# Patient Record
Sex: FEMALE | Race: WHITE | Employment: OTHER | URBAN - METROPOLITAN AREA
[De-identification: names, ages, dates, MRNs, and addresses within clinical notes are randomized per-mention and may not be internally consistent; named-entity substitution may affect disease eponyms.]

---

## 2017-01-31 ENCOUNTER — OFFICE VISIT (OUTPATIENT)
Dept: CARDIOLOGY | Age: 57
End: 2017-01-31

## 2017-01-31 VITALS
BODY MASS INDEX: 41.82 KG/M2 | WEIGHT: 221.5 LBS | OXYGEN SATURATION: 97 % | HEIGHT: 61 IN | TEMPERATURE: 97.9 F | SYSTOLIC BLOOD PRESSURE: 128 MMHG | RESPIRATION RATE: 18 BRPM | HEART RATE: 83 BPM | DIASTOLIC BLOOD PRESSURE: 78 MMHG

## 2017-01-31 DIAGNOSIS — R06.02 SOB (SHORTNESS OF BREATH): ICD-10-CM

## 2017-01-31 DIAGNOSIS — R53.83 FATIGUE, UNSPECIFIED TYPE: ICD-10-CM

## 2017-01-31 DIAGNOSIS — E66.01 MORBID OBESITY, UNSPECIFIED OBESITY TYPE (HCC): ICD-10-CM

## 2017-01-31 DIAGNOSIS — I44.7 LEFT BUNDLE BRANCH BLOCK: ICD-10-CM

## 2017-01-31 DIAGNOSIS — R94.31 ABNORMAL EKG: Primary | ICD-10-CM

## 2017-01-31 PROCEDURE — G8427 DOCREV CUR MEDS BY ELIG CLIN: HCPCS | Performed by: INTERNAL MEDICINE

## 2017-01-31 PROCEDURE — 3014F SCREEN MAMMO DOC REV: CPT | Performed by: INTERNAL MEDICINE

## 2017-01-31 PROCEDURE — G8419 CALC BMI OUT NRM PARAM NOF/U: HCPCS | Performed by: INTERNAL MEDICINE

## 2017-01-31 PROCEDURE — 3017F COLORECTAL CA SCREEN DOC REV: CPT | Performed by: INTERNAL MEDICINE

## 2017-01-31 PROCEDURE — G8484 FLU IMMUNIZE NO ADMIN: HCPCS | Performed by: INTERNAL MEDICINE

## 2017-01-31 PROCEDURE — 1036F TOBACCO NON-USER: CPT | Performed by: INTERNAL MEDICINE

## 2017-01-31 PROCEDURE — 99214 OFFICE O/P EST MOD 30 MIN: CPT | Performed by: INTERNAL MEDICINE

## 2017-02-07 ENCOUNTER — HOSPITAL ENCOUNTER (OUTPATIENT)
Dept: NON INVASIVE DIAGNOSTICS | Age: 57
Discharge: HOME | End: 2017-02-07
Payer: COMMERCIAL

## 2017-02-07 DIAGNOSIS — R94.31 ABNORMAL EKG: ICD-10-CM

## 2017-02-07 DIAGNOSIS — I44.7 LEFT BUNDLE BRANCH BLOCK: ICD-10-CM

## 2017-02-07 LAB
LV EF: 55 %
LVEF MODALITY: NORMAL

## 2017-02-07 PROCEDURE — 93306 TTE W/DOPPLER COMPLETE: CPT

## 2017-02-16 LAB
VITAMIN D 25-HYDROXY: 26
VITAMIN D2, 25 HYDROXY: NORMAL
VITAMIN D3,25 HYDROXY: NORMAL

## 2017-02-16 ASSESSMENT — ENCOUNTER SYMPTOMS
ALLERGIC/IMMUNOLOGIC NEGATIVE: 1
SHORTNESS OF BREATH: 1
CHEST TIGHTNESS: 0
GASTROINTESTINAL NEGATIVE: 1
EYES NEGATIVE: 1

## 2017-04-18 ENCOUNTER — HOSPITAL ENCOUNTER (OUTPATIENT)
Dept: NEUROLOGY | Age: 57
Discharge: HOME | End: 2017-04-18
Payer: COMMERCIAL

## 2017-04-18 PROCEDURE — 95886 MUSC TEST DONE W/N TEST COMP: CPT

## 2017-04-18 PROCEDURE — 95911 NRV CNDJ TEST 9-10 STUDIES: CPT

## 2017-06-01 DIAGNOSIS — F41.9 ANXIETY: ICD-10-CM

## 2017-06-01 RX ORDER — AMITRIPTYLINE HYDROCHLORIDE 10 MG/1
TABLET, FILM COATED ORAL
Qty: 90 TABLET | Refills: 3 | Status: SHIPPED | OUTPATIENT
Start: 2017-06-01 | End: 2017-12-05 | Stop reason: SDUPTHER

## 2017-12-05 DIAGNOSIS — F41.9 ANXIETY: ICD-10-CM

## 2017-12-06 RX ORDER — AMITRIPTYLINE HYDROCHLORIDE 10 MG/1
TABLET, FILM COATED ORAL
Qty: 90 TABLET | Refills: 3 | Status: SHIPPED | OUTPATIENT
Start: 2017-12-06 | End: 2018-01-03 | Stop reason: SDUPTHER

## 2018-01-03 ENCOUNTER — OFFICE VISIT (OUTPATIENT)
Dept: CARDIOLOGY | Age: 58
End: 2018-01-03

## 2018-01-03 VITALS
RESPIRATION RATE: 16 BRPM | SYSTOLIC BLOOD PRESSURE: 118 MMHG | HEIGHT: 61 IN | DIASTOLIC BLOOD PRESSURE: 72 MMHG | WEIGHT: 222 LBS | HEART RATE: 83 BPM | OXYGEN SATURATION: 97 % | TEMPERATURE: 98.1 F | BODY MASS INDEX: 41.91 KG/M2

## 2018-01-03 DIAGNOSIS — R06.02 SOB (SHORTNESS OF BREATH): ICD-10-CM

## 2018-01-03 DIAGNOSIS — I47.1 PSVT (PAROXYSMAL SUPRAVENTRICULAR TACHYCARDIA) (HCC): Primary | ICD-10-CM

## 2018-01-03 DIAGNOSIS — E78.5 HYPERLIPIDEMIA, UNSPECIFIED HYPERLIPIDEMIA TYPE: ICD-10-CM

## 2018-01-03 DIAGNOSIS — I44.7 LEFT BUNDLE BRANCH BLOCK: ICD-10-CM

## 2018-01-03 DIAGNOSIS — E66.01 MORBID OBESITY, UNSPECIFIED OBESITY TYPE (HCC): ICD-10-CM

## 2018-01-03 DIAGNOSIS — F41.9 ANXIETY: ICD-10-CM

## 2018-01-03 PROCEDURE — 93000 ELECTROCARDIOGRAM COMPLETE: CPT | Performed by: INTERNAL MEDICINE

## 2018-01-03 PROCEDURE — 3014F SCREEN MAMMO DOC REV: CPT | Performed by: INTERNAL MEDICINE

## 2018-01-03 PROCEDURE — G8427 DOCREV CUR MEDS BY ELIG CLIN: HCPCS | Performed by: INTERNAL MEDICINE

## 2018-01-03 PROCEDURE — G8417 CALC BMI ABV UP PARAM F/U: HCPCS | Performed by: INTERNAL MEDICINE

## 2018-01-03 PROCEDURE — 3017F COLORECTAL CA SCREEN DOC REV: CPT | Performed by: INTERNAL MEDICINE

## 2018-01-03 PROCEDURE — 99213 OFFICE O/P EST LOW 20 MIN: CPT | Performed by: INTERNAL MEDICINE

## 2018-01-03 PROCEDURE — G8484 FLU IMMUNIZE NO ADMIN: HCPCS | Performed by: INTERNAL MEDICINE

## 2018-01-03 PROCEDURE — 1036F TOBACCO NON-USER: CPT | Performed by: INTERNAL MEDICINE

## 2018-01-03 RX ORDER — FEXOFENADINE HYDROCHLORIDE 60 MG/1
60 TABLET, FILM COATED ORAL DAILY
COMMUNITY

## 2018-01-03 RX ORDER — LOPERAMIDE HYDROCHLORIDE 2 MG/1
2 CAPSULE ORAL 4 TIMES DAILY PRN
COMMUNITY
End: 2021-01-22

## 2018-01-03 RX ORDER — AMITRIPTYLINE HYDROCHLORIDE 10 MG/1
TABLET, FILM COATED ORAL
Qty: 90 TABLET | Refills: 1 | Status: SHIPPED | OUTPATIENT
Start: 2018-01-03 | End: 2019-03-18 | Stop reason: SDUPTHER

## 2018-01-03 RX ORDER — MECLIZINE HYDROCHLORIDE 25 MG/1
25 TABLET ORAL 2 TIMES DAILY
COMMUNITY
End: 2019-12-23 | Stop reason: SDUPTHER

## 2018-01-03 ASSESSMENT — ENCOUNTER SYMPTOMS
BLOOD IN STOOL: 0
NAUSEA: 0
SHORTNESS OF BREATH: 1
CHEST TIGHTNESS: 0
COUGH: 0
STRIDOR: 0
GASTROINTESTINAL NEGATIVE: 1
EYES NEGATIVE: 1
WHEEZING: 0

## 2018-01-03 NOTE — PROGRESS NOTES
Subsequent Progress Note  Patient: Amanda Andersen  YOB: 1960  MRN: [id number]    Chief Complaint:  Chief Complaint   Patient presents with    Cardiomyopathy     1 yr f/u, Danny Cottrell patient    Tachycardia   2/2017 echo 55%  7/2016  Deep Brain Stimulator for tremors. (device is located Left Pectoral)- followed by Jazmin Villalpando      Subjective/HPI_ No more palpiations. Has wagner no cp. Taking all meds.   No falls tremors much improved with DBS     EKG:SR 78 LBBB  Past Medical History:   Diagnosis Date    Acute serous otitis media     Asthma     Cellulitis     of left toe    Chronic depression     CTS (carpal tunnel syndrome)     Hyperlipidemia     Lumbar radiculopathy     PSVT [facility]parox[facility] tachycardia) [facility]     Seasonal allergic rhinitis     Tremor     bilateral, nonspecific        Past Surgical History:   Procedure Laterality Date    BACK SURGERY      CARDIAC CATHETERIZATION  2008    Micro vascular angina       Family History   Problem Relation Age of Onset    Miscarriages / Stillbirths Mother     Early Death Father     Heart Disease Father     High Blood Pressure Father        Social History     Social History    Marital status:      Spouse name: N/A    Number of children: N/A    Years of education: N/A     Social History Main Topics    Smoking status: Never Smoker    Smokeless tobacco: Never Used    Alcohol use Yes      Comment: very rare    Drug use: Unknown    Sexual activity: Not Asked     Other Topics Concern    None     Social History Narrative    None       Allergies   Allergen Reactions    Amoxicillin Itching     Burning and itching of the skin    Lipitor     Robaxin [Methocarbamol]        Current Outpatient Prescriptions   Medication Sig Dispense Refill    meclizine (ANTIVERT) 25 MG tablet Take 25 mg by mouth 2 times daily      fexofenadine (ALLEGRA ALLERGY) 60 MG tablet Take 60 mg by mouth daily      loperamide (IMODIUM) 2 MG capsule Take  Acute serous otitis media    Asthma    Chronic depression    Tremor    Seasonal allergic rhinitis    CTS (carpal tunnel syndrome)    Lumbar radiculopathy    Hyperlipidemia    PSVT (paroxysmal supraventricular tachycardia) (HCC)    Left bundle branch block    SOB (shortness of breath)    Morbid obesity, unspecified obesity type ([facility])       Assessment/Plan:    1. PSVT (paroxysmal supraventricular tachycardia) (HCC)  stable  - EKG 12 Lead    2. Left bundle branch block  chronic    3. SOB (shortness of breath)  [place] in part relatd to Obesity    4. Anxiety     - amitriptyline (ELAVIL) 10 MG tablet; TAKE 1 TABLET DAILY AS NEEDED  Dispense: 90 tablet; Refill: 1. Going forward she will get meds from PCP or neuro    5. Hyperlipidemia, unspecified hyperlipidemia type  statin    6. Morbid obesity, unspecified obesity type ([facility] [address]  Improve BMI       Counseling:  Heart Healthy Lifestyle, Improve BMI, Low Salt Diet, Take Precautions to Prevent Falls and Walk Daily    Return in about 6 months (around 7/3/2018).       Electronically signed by Paulita Cabot, MD on 1/3/2018 at 1:24 PM

## 2019-01-03 ENCOUNTER — OFFICE VISIT (OUTPATIENT)
Dept: CARDIOLOGY CLINIC | Age: 59
End: 2019-01-03
Payer: COMMERCIAL

## 2019-01-03 VITALS
BODY MASS INDEX: 43.05 KG/M2 | HEART RATE: 88 BPM | OXYGEN SATURATION: 94 % | SYSTOLIC BLOOD PRESSURE: 135 MMHG | HEIGHT: 61 IN | RESPIRATION RATE: 16 BRPM | DIASTOLIC BLOOD PRESSURE: 71 MMHG | WEIGHT: 228 LBS

## 2019-01-03 DIAGNOSIS — I44.7 LEFT BUNDLE BRANCH BLOCK: ICD-10-CM

## 2019-01-03 DIAGNOSIS — R06.02 SOB (SHORTNESS OF BREATH): ICD-10-CM

## 2019-01-03 DIAGNOSIS — E78.5 HYPERLIPIDEMIA, UNSPECIFIED HYPERLIPIDEMIA TYPE: ICD-10-CM

## 2019-01-03 DIAGNOSIS — I47.1 PSVT (PAROXYSMAL SUPRAVENTRICULAR TACHYCARDIA) (HCC): ICD-10-CM

## 2019-01-03 PROCEDURE — 93000 ELECTROCARDIOGRAM COMPLETE: CPT | Performed by: INTERNAL MEDICINE

## 2019-01-03 PROCEDURE — G8484 FLU IMMUNIZE NO ADMIN: HCPCS | Performed by: INTERNAL MEDICINE

## 2019-01-03 PROCEDURE — G8417 CALC BMI ABV UP PARAM F/U: HCPCS | Performed by: INTERNAL MEDICINE

## 2019-01-03 PROCEDURE — 3017F COLORECTAL CA SCREEN DOC REV: CPT | Performed by: INTERNAL MEDICINE

## 2019-01-03 PROCEDURE — 99214 OFFICE O/P EST MOD 30 MIN: CPT | Performed by: INTERNAL MEDICINE

## 2019-01-03 PROCEDURE — G8427 DOCREV CUR MEDS BY ELIG CLIN: HCPCS | Performed by: INTERNAL MEDICINE

## 2019-01-03 PROCEDURE — 1036F TOBACCO NON-USER: CPT | Performed by: INTERNAL MEDICINE

## 2019-01-03 RX ORDER — PAROXETINE 30 MG/1
TABLET, FILM COATED ORAL
COMMUNITY
Start: 2017-12-10 | End: 2021-04-02

## 2019-01-03 RX ORDER — NITROGLYCERIN 0.4 MG/1
0.4 TABLET SUBLINGUAL PRN
Qty: 25 TABLET | Refills: 5 | Status: SHIPPED | OUTPATIENT
Start: 2019-01-03 | End: 2021-01-22 | Stop reason: SDUPTHER

## 2019-01-03 ASSESSMENT — ENCOUNTER SYMPTOMS
NAUSEA: 0
GASTROINTESTINAL NEGATIVE: 1
SHORTNESS OF BREATH: 1
COUGH: 0
STRIDOR: 0
CHEST TIGHTNESS: 0
BLOOD IN STOOL: 0
EYES NEGATIVE: 1
WHEEZING: 0

## 2019-01-18 ENCOUNTER — HOSPITAL ENCOUNTER (OUTPATIENT)
Dept: NUCLEAR MEDICINE | Age: 59
Discharge: HOME | End: 2019-01-20
Payer: COMMERCIAL

## 2019-01-18 DIAGNOSIS — R06.02 SOB (SHORTNESS OF BREATH): ICD-10-CM

## 2019-01-18 PROCEDURE — 93017 CV STRESS TEST TRACING ONLY: CPT

## 2019-01-18 PROCEDURE — 3430000000 HC RX DIAGNOSTIC RADIOPHARMACEUTICAL: Performed by: INTERNAL MEDICINE

## 2019-01-18 PROCEDURE — 6360000002 HC RX W HCPCS: Performed by: INTERNAL MEDICINE

## 2019-01-18 PROCEDURE — 2580000003 HC RX 258: Performed by: INTERNAL MEDICINE

## 2019-01-18 PROCEDURE — 78452 HT MUSCLE IMAGE SPECT MULT: CPT

## 2019-01-18 PROCEDURE — A9502 TC99M TETROFOSMIN: HCPCS | Performed by: INTERNAL MEDICINE

## 2019-01-18 RX ORDER — SODIUM CHLORIDE 0.9 % (FLUSH) 0.9 %
10 SYRINGE (ML) INJECTION PRN
Status: COMPLETED | OUTPATIENT
Start: 2019-01-18 | End: 2019-01-18

## 2019-01-18 RX ADMIN — TETROFOSMIN 35.4 MILLICURIE: 0.23 INJECTION, POWDER, LYOPHILIZED, FOR SOLUTION INTRAVENOUS at 11:20

## 2019-01-18 RX ADMIN — Medication 10 ML: at 11:20

## 2019-01-18 RX ADMIN — REGADENOSON 0.4 MG: 0.08 INJECTION, SOLUTION INTRAVENOUS at 11:20

## 2019-01-18 RX ADMIN — Medication 10 ML: at 11:21

## 2019-01-18 RX ADMIN — Medication 10 ML: at 09:50

## 2019-01-18 RX ADMIN — TETROFOSMIN 10.5 MILLICURIE: 0.23 INJECTION, POWDER, LYOPHILIZED, FOR SOLUTION INTRAVENOUS at 09:50

## 2019-02-05 ENCOUNTER — TELEPHONE (OUTPATIENT)
Dept: CARDIOLOGY CLINIC | Age: 59
End: 2019-02-05

## 2019-03-18 DIAGNOSIS — F41.9 ANXIETY: ICD-10-CM

## 2019-03-18 RX ORDER — AMITRIPTYLINE HYDROCHLORIDE 10 MG/1
TABLET, FILM COATED ORAL
Qty: 90 TABLET | Refills: 1 | Status: SHIPPED | OUTPATIENT
Start: 2019-03-18

## 2019-08-14 ENCOUNTER — OFFICE VISIT (OUTPATIENT)
Dept: CARDIOLOGY CLINIC | Age: 59
End: 2019-08-14
Payer: COMMERCIAL

## 2019-08-14 VITALS
OXYGEN SATURATION: 95 % | WEIGHT: 222.6 LBS | BODY MASS INDEX: 42.03 KG/M2 | HEART RATE: 86 BPM | DIASTOLIC BLOOD PRESSURE: 68 MMHG | HEIGHT: 61 IN | SYSTOLIC BLOOD PRESSURE: 106 MMHG | RESPIRATION RATE: 16 BRPM

## 2019-08-14 DIAGNOSIS — I47.1 PSVT (PAROXYSMAL SUPRAVENTRICULAR TACHYCARDIA) (HCC): Primary | ICD-10-CM

## 2019-08-14 DIAGNOSIS — R06.02 SHORTNESS OF BREATH: ICD-10-CM

## 2019-08-14 DIAGNOSIS — I44.7 LEFT BUNDLE BRANCH BLOCK: ICD-10-CM

## 2019-08-14 DIAGNOSIS — E78.00 PURE HYPERCHOLESTEROLEMIA: ICD-10-CM

## 2019-08-14 DIAGNOSIS — I10 ESSENTIAL HYPERTENSION: ICD-10-CM

## 2019-08-14 PROCEDURE — G8427 DOCREV CUR MEDS BY ELIG CLIN: HCPCS | Performed by: INTERNAL MEDICINE

## 2019-08-14 PROCEDURE — 1036F TOBACCO NON-USER: CPT | Performed by: INTERNAL MEDICINE

## 2019-08-14 PROCEDURE — 3017F COLORECTAL CA SCREEN DOC REV: CPT | Performed by: INTERNAL MEDICINE

## 2019-08-14 PROCEDURE — 99214 OFFICE O/P EST MOD 30 MIN: CPT | Performed by: INTERNAL MEDICINE

## 2019-08-14 PROCEDURE — G8417 CALC BMI ABV UP PARAM F/U: HCPCS | Performed by: INTERNAL MEDICINE

## 2019-08-14 RX ORDER — ALBUTEROL SULFATE 1.25 MG/3ML
1 SOLUTION RESPIRATORY (INHALATION) EVERY 6 HOURS PRN
COMMUNITY

## 2019-08-14 RX ORDER — BUDESONIDE AND FORMOTEROL FUMARATE DIHYDRATE 160; 4.5 UG/1; UG/1
2 AEROSOL RESPIRATORY (INHALATION) 2 TIMES DAILY
COMMUNITY

## 2019-08-14 RX ORDER — FLUTICASONE PROPIONATE 50 MCG
1 SPRAY, SUSPENSION (ML) NASAL DAILY
COMMUNITY
End: 2021-01-22

## 2019-08-14 ASSESSMENT — ENCOUNTER SYMPTOMS
NAUSEA: 0
BLOOD IN STOOL: 0
COUGH: 0
EYES NEGATIVE: 1
WHEEZING: 0
CHEST TIGHTNESS: 0
GASTROINTESTINAL NEGATIVE: 1
SHORTNESS OF BREATH: 1
STRIDOR: 0

## 2019-10-22 ENCOUNTER — OFFICE VISIT (OUTPATIENT)
Dept: NEUROLOGY | Age: 59
End: 2019-10-22
Payer: COMMERCIAL

## 2019-10-22 VITALS — WEIGHT: 226.5 LBS | HEIGHT: 61 IN | BODY MASS INDEX: 42.76 KG/M2

## 2019-10-22 DIAGNOSIS — H81.10 BENIGN PAROXYSMAL POSITIONAL VERTIGO, UNSPECIFIED LATERALITY: ICD-10-CM

## 2019-10-22 DIAGNOSIS — R25.1 TREMORS OF NERVOUS SYSTEM: ICD-10-CM

## 2019-10-22 DIAGNOSIS — G25.0 BENIGN ESSENTIAL TREMOR: Primary | ICD-10-CM

## 2019-10-22 LAB
ALBUMIN SERPL-MCNC: 4.2 G/DL (ref 3.5–4.6)
ALP BLD-CCNC: 173 U/L (ref 40–130)
ALT SERPL-CCNC: 24 U/L (ref 0–33)
AST SERPL-CCNC: 26 U/L (ref 0–35)
BASOPHILS ABSOLUTE: 0.1 K/UL (ref 0–0.2)
BASOPHILS RELATIVE PERCENT: 0.9 %
BILIRUB SERPL-MCNC: <0.2 MG/DL (ref 0.2–0.7)
BILIRUBIN DIRECT: <0.2 MG/DL (ref 0–0.4)
BILIRUBIN, INDIRECT: ABNORMAL MG/DL (ref 0–0.6)
EOSINOPHILS ABSOLUTE: 0.1 K/UL (ref 0–0.7)
EOSINOPHILS RELATIVE PERCENT: 2.2 %
HCT VFR BLD CALC: 41.5 % (ref 37–47)
HEMOGLOBIN: 14.3 G/DL (ref 12–16)
LYMPHOCYTES ABSOLUTE: 2.3 K/UL (ref 1–4.8)
LYMPHOCYTES RELATIVE PERCENT: 34.1 %
MCH RBC QN AUTO: 34 PG (ref 27–31.3)
MCHC RBC AUTO-ENTMCNC: 34.4 % (ref 33–37)
MCV RBC AUTO: 98.9 FL (ref 82–100)
MONOCYTES ABSOLUTE: 0.7 K/UL (ref 0.2–0.8)
MONOCYTES RELATIVE PERCENT: 9.5 %
NEUTROPHILS ABSOLUTE: 3.6 K/UL (ref 1.4–6.5)
NEUTROPHILS RELATIVE PERCENT: 53.3 %
PDW BLD-RTO: 13 % (ref 11.5–14.5)
PLATELET # BLD: 273 K/UL (ref 130–400)
RBC # BLD: 4.2 M/UL (ref 4.2–5.4)
TOTAL PROTEIN: 7.8 G/DL (ref 6.3–8)
WBC # BLD: 6.8 K/UL (ref 4.8–10.8)

## 2019-10-22 PROCEDURE — 99214 OFFICE O/P EST MOD 30 MIN: CPT | Performed by: PSYCHIATRY & NEUROLOGY

## 2019-10-22 RX ORDER — PRIMIDONE 250 MG/1
250 TABLET ORAL 2 TIMES DAILY
Qty: 180 TABLET | Refills: 3 | Status: SHIPPED | OUTPATIENT
Start: 2019-10-22 | End: 2020-09-28

## 2019-10-22 ASSESSMENT — ENCOUNTER SYMPTOMS
PHOTOPHOBIA: 0
CHOKING: 0
NAUSEA: 0
BACK PAIN: 0
TROUBLE SWALLOWING: 0
VOMITING: 0
SHORTNESS OF BREATH: 0

## 2019-12-17 ENCOUNTER — APPOINTMENT (OUTPATIENT)
Dept: CT IMAGING | Age: 59
End: 2019-12-17
Payer: COMMERCIAL

## 2019-12-17 ENCOUNTER — HOSPITAL ENCOUNTER (EMERGENCY)
Age: 59
Discharge: HOME | End: 2019-12-17
Attending: STUDENT IN AN ORGANIZED HEALTH CARE EDUCATION/TRAINING PROGRAM
Payer: COMMERCIAL

## 2019-12-17 VITALS
DIASTOLIC BLOOD PRESSURE: 75 MMHG | SYSTOLIC BLOOD PRESSURE: 123 MMHG | WEIGHT: 229 LBS | HEIGHT: 61 IN | RESPIRATION RATE: 18 BRPM | OXYGEN SATURATION: 96 % | HEART RATE: 69 BPM | TEMPERATURE: 98 F | BODY MASS INDEX: 43.23 KG/M2

## 2019-12-17 DIAGNOSIS — S09.90XA CLOSED HEAD INJURY, INITIAL ENCOUNTER: ICD-10-CM
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
## 2019-12-17 DIAGNOSIS — S06.0X0A CONCUSSION WITHOUT LOSS OF CONSCIOUSNESS, INITIAL ENCOUNTER: Primary | ICD-10-CM

## 2019-12-17 LAB
ALBUMIN SERPL-MCNC: 4.2 G/DL (ref 3.5–4.6)
ALP BLD-CCNC: 175 U/L (ref 40–130)
ALT SERPL-CCNC: 29 U/L (ref 0–33)
ANION GAP SERPL CALCULATED.3IONS-SCNC: 12 MEQ/L (ref 9–15)
APTT: 28.9 SEC (ref 24.4–36.8)
AST SERPL-CCNC: 36 U/L (ref 0–35)
BASOPHILS ABSOLUTE: 0 K/UL (ref 0–0.2)
BASOPHILS RELATIVE PERCENT: 0.4 %
BILIRUB SERPL-MCNC: 0.3 MG/DL (ref 0.2–0.7)
BILIRUBIN URINE: NEGATIVE
BLOOD, URINE: NEGATIVE
BUN BLDV-MCNC: 9 MG/DL (ref 6–20)
CALCIUM SERPL-MCNC: 9.1 MG/DL (ref 8.5–9.9)
CHLORIDE BLD-SCNC: 100 MEQ/L (ref 95–107)
CLARITY: CLEAR
CO2: 25 MEQ/L (ref 20–31)
COLOR: YELLOW
CREAT SERPL-MCNC: 0.53 MG/DL (ref 0.5–0.9)
EOSINOPHILS ABSOLUTE: 0.1 K/UL (ref 0–0.7)
EOSINOPHILS RELATIVE PERCENT: 1.8 %
GFR AFRICAN AMERICAN: >60
GFR NON-AFRICAN AMERICAN: >60
GLOBULIN: 3.4 G/DL (ref 2.3–3.5)
GLUCOSE BLD-MCNC: 140 MG/DL (ref 70–99)
GLUCOSE URINE: NEGATIVE MG/DL
HCT VFR BLD CALC: 40.1 % (ref 37–47)
HEMOGLOBIN: 13.9 G/DL (ref 12–16)
INR BLD: 1
KETONES, URINE: NEGATIVE MG/DL
LEUKOCYTE ESTERASE, URINE: NEGATIVE
LYMPHOCYTES ABSOLUTE: 2.1 K/UL (ref 1–4.8)
LYMPHOCYTES RELATIVE PERCENT: 32.5 %
MCH RBC QN AUTO: 33.7 PG (ref 27–31.3)
MCHC RBC AUTO-ENTMCNC: 34.7 % (ref 33–37)
MCV RBC AUTO: 97 FL (ref 82–100)
MONOCYTES ABSOLUTE: 0.6 K/UL (ref 0.2–0.8)
MONOCYTES RELATIVE PERCENT: 10 %
NEUTROPHILS ABSOLUTE: 3.5 K/UL (ref 1.4–6.5)
NEUTROPHILS RELATIVE PERCENT: 55.3 %
NITRITE, URINE: NEGATIVE
PDW BLD-RTO: 13.1 % (ref 11.5–14.5)
PH UA: 7.5 (ref 5–9)
PLATELET # BLD: 240 K/UL (ref 130–400)
POTASSIUM SERPL-SCNC: 4.2 MEQ/L (ref 3.4–4.9)
PROTEIN UA: NEGATIVE MG/DL
PROTHROMBIN TIME: 13.2 SEC (ref 12.3–14.9)
RBC # BLD: 4.13 M/UL (ref 4.2–5.4)
SODIUM BLD-SCNC: 137 MEQ/L (ref 135–144)
SPECIFIC GRAVITY UA: 1.01 (ref 1–1.03)
TOTAL PROTEIN: 7.6 G/DL (ref 6.3–8)
URINE REFLEX TO CULTURE: NORMAL
UROBILINOGEN, URINE: 0.2 E.U./DL
WBC # BLD: 6.4 K/UL (ref 4.8–10.8)

## 2019-12-17 PROCEDURE — 85025 COMPLETE CBC W/AUTO DIFF WBC: CPT

## 2019-12-17 PROCEDURE — 6360000002 HC RX W HCPCS: Performed by: STUDENT IN AN ORGANIZED HEALTH CARE EDUCATION/TRAINING PROGRAM

## 2019-12-17 PROCEDURE — 96375 TX/PRO/DX INJ NEW DRUG ADDON: CPT

## 2019-12-17 PROCEDURE — 36415 COLL VENOUS BLD VENIPUNCTURE: CPT

## 2019-12-17 PROCEDURE — 96372 THER/PROPH/DIAG INJ SC/IM: CPT

## 2019-12-17 PROCEDURE — 80053 COMPREHEN METABOLIC PANEL: CPT

## 2019-12-17 PROCEDURE — 72125 CT NECK SPINE W/O DYE: CPT

## 2019-12-17 PROCEDURE — 96365 THER/PROPH/DIAG IV INF INIT: CPT

## 2019-12-17 PROCEDURE — 70450 CT HEAD/BRAIN W/O DYE: CPT

## 2019-12-17 PROCEDURE — 81003 URINALYSIS AUTO W/O SCOPE: CPT

## 2019-12-17 PROCEDURE — 85610 PROTHROMBIN TIME: CPT

## 2019-12-17 PROCEDURE — 85730 THROMBOPLASTIN TIME PARTIAL: CPT

## 2019-12-17 PROCEDURE — 99284 EMERGENCY DEPT VISIT MOD MDM: CPT

## 2019-12-17 RX ORDER — PROMETHAZINE HYDROCHLORIDE 25 MG/1
25 TABLET ORAL EVERY 8 HOURS PRN
Qty: 7 TABLET | Refills: 0 | Status: SHIPPED | OUTPATIENT
Start: 2019-12-17 | End: 2020-02-12

## 2019-12-17 RX ORDER — PROMETHAZINE HYDROCHLORIDE 25 MG/ML
25 INJECTION, SOLUTION INTRAMUSCULAR; INTRAVENOUS ONCE
Status: COMPLETED | OUTPATIENT
Start: 2019-12-17 | End: 2019-12-17

## 2019-12-17 RX ORDER — DEXAMETHASONE SODIUM PHOSPHATE 10 MG/ML
10 INJECTION INTRAMUSCULAR; INTRAVENOUS ONCE
Status: COMPLETED | OUTPATIENT
Start: 2019-12-17 | End: 2019-12-17

## 2019-12-17 RX ORDER — MAGNESIUM SULFATE IN WATER 40 MG/ML
2 INJECTION, SOLUTION INTRAVENOUS ONCE
Status: COMPLETED | OUTPATIENT
Start: 2019-12-17 | End: 2019-12-17

## 2019-12-17 RX ORDER — ACETAMINOPHEN 500 MG
1000 TABLET ORAL EVERY 6 HOURS PRN
Qty: 120 TABLET | Refills: 0 | Status: SHIPPED | OUTPATIENT
Start: 2019-12-17

## 2019-12-17 RX ADMIN — DEXAMETHASONE SODIUM PHOSPHATE 10 MG: 10 INJECTION INTRAMUSCULAR; INTRAVENOUS at 13:52

## 2019-12-17 RX ADMIN — PROMETHAZINE HYDROCHLORIDE 25 MG: 25 INJECTION, SOLUTION INTRAMUSCULAR; INTRAVENOUS at 13:52

## 2019-12-17 RX ADMIN — MAGNESIUM SULFATE HEPTAHYDRATE 2 G: 40 INJECTION, SOLUTION INTRAVENOUS at 13:52

## 2019-12-17 ASSESSMENT — ENCOUNTER SYMPTOMS
CHEST TIGHTNESS: 0
BACK PAIN: 0
SHORTNESS OF BREATH: 0
SINUS PRESSURE: 0
ABDOMINAL PAIN: 0
PHOTOPHOBIA: 0
COUGH: 0
TROUBLE SWALLOWING: 0
VOMITING: 0
DIARRHEA: 0

## 2019-12-17 ASSESSMENT — PAIN DESCRIPTION - FREQUENCY: FREQUENCY: CONTINUOUS

## 2019-12-17 ASSESSMENT — PAIN DESCRIPTION - DESCRIPTORS: DESCRIPTORS: ACHING;HEADACHE

## 2019-12-17 ASSESSMENT — PAIN DESCRIPTION - LOCATION: LOCATION: HEAD

## 2019-12-17 ASSESSMENT — PAIN DESCRIPTION - PAIN TYPE: TYPE: ACUTE PAIN

## 2019-12-17 ASSESSMENT — PAIN SCALES - GENERAL: PAINLEVEL_OUTOF10: 4

## 2019-12-23 RX ORDER — MECLIZINE HYDROCHLORIDE 25 MG/1
25 TABLET ORAL 2 TIMES DAILY
Qty: 60 TABLET | Refills: 2 | Status: SHIPPED | OUTPATIENT
Start: 2019-12-23 | End: 2020-04-03 | Stop reason: SDUPTHER

## 2019-12-27 ENCOUNTER — OFFICE VISIT (OUTPATIENT)
Dept: NEUROLOGY | Age: 59
End: 2019-12-27
Payer: COMMERCIAL

## 2019-12-27 VITALS — DIASTOLIC BLOOD PRESSURE: 70 MMHG | BODY MASS INDEX: 43.61 KG/M2 | WEIGHT: 230.8 LBS | SYSTOLIC BLOOD PRESSURE: 136 MMHG

## 2019-12-27 DIAGNOSIS — S06.0X0A CONCUSSION WITHOUT LOSS OF CONSCIOUSNESS, INITIAL ENCOUNTER: Primary | ICD-10-CM

## 2019-12-27 DIAGNOSIS — H81.10 BENIGN PAROXYSMAL POSITIONAL VERTIGO, UNSPECIFIED LATERALITY: ICD-10-CM

## 2019-12-27 DIAGNOSIS — M54.12 CERVICAL RADICULOPATHY: ICD-10-CM

## 2019-12-27 DIAGNOSIS — G25.0 BENIGN ESSENTIAL TREMOR: ICD-10-CM

## 2019-12-27 DIAGNOSIS — G56.01 CARPAL TUNNEL SYNDROME OF RIGHT WRIST: ICD-10-CM

## 2019-12-27 PROCEDURE — 99215 OFFICE O/P EST HI 40 MIN: CPT | Performed by: PSYCHIATRY & NEUROLOGY

## 2019-12-27 ASSESSMENT — ENCOUNTER SYMPTOMS
BACK PAIN: 0
CHOKING: 0
PHOTOPHOBIA: 0
TROUBLE SWALLOWING: 0
NAUSEA: 0
SHORTNESS OF BREATH: 0
VOMITING: 0

## 2020-01-07 ENCOUNTER — HOSPITAL ENCOUNTER (OUTPATIENT)
Dept: PHYSICAL THERAPY | Age: 60
Setting detail: THERAPIES SERIES
Discharge: HOME | End: 2020-01-07
Payer: COMMERCIAL

## 2020-01-07 PROCEDURE — 97162 PT EVAL MOD COMPLEX 30 MIN: CPT

## 2020-01-07 ASSESSMENT — PAIN SCALES - GENERAL: PAINLEVEL_OUTOF10: 4

## 2020-01-07 ASSESSMENT — PAIN DESCRIPTION - PAIN TYPE: TYPE: ACUTE PAIN

## 2020-01-07 ASSESSMENT — PAIN DESCRIPTION - ORIENTATION: ORIENTATION: RIGHT;LEFT

## 2020-01-07 ASSESSMENT — PAIN DESCRIPTION - LOCATION: LOCATION: NECK

## 2020-01-07 ASSESSMENT — PAIN DESCRIPTION - DESCRIPTORS: DESCRIPTORS: ACHING;THROBBING

## 2020-01-07 NOTE — PROGRESS NOTES
[address]  PHYSICAL THERAPY EVALUATION    Date: 2020  Patient Name: Carollee Castleman       MRN: [id number]   Account: [de-identified]   : 1960  (61 y.o.)   Gender: female   Referring Practitioner: Steve Adan MD                 Diagnosis: Cervical radiculopathy  Treatment Diagnosis: cervical pain  Additional Pertinent Hx: esstenial tremors, GERD, lumbar fusion, deep brain stimulation 2016 (pacemaker with deep brain stimulation), asthma          Past Medical History:  has a past medical history of Acute serous otitis media, Asthma, Cellulitis, Chronic depression, CTS (carpal tunnel syndrome), Essential hypertension, Hyperlipidemia, Lumbar radiculopathy, PSVT (paroxysmal supraventricular tachycardia) ([facility] [address] Seasonal allergic rhinitis, and Tremor. Past Surgical History:   has a past surgical history that includes back surgery and Cardiac catheterization (). Vital Signs  Patient Currently in Pain: Yes   Pain Screening  Patient Currently in Pain: Yes  Pain Assessment  Pain Assessment: 0-10  Pain Level: 4  Pain Type: Acute pain  Pain Location: Neck  Pain Orientation: Right;Left  Pain Descriptors: Aching; Throbbing                Lives With: Spouse  Type of Home: House  Home Layout: Two level  Home Access: Stairs to enter with rails  Entrance Stairs - Number of Steps: 4  ADL Assistance: Independent  Ambulation Assistance: Independent  Transfer Assistance: Independent  Occupation: Retired  Additional Comments: hx of one fall in December        Subjective:  Subjective: Patient reports sudden fall on 12/10/19 without cause. Reports going to ER 7 days later. Reports CT and x-ray was negative for fracture and referred to neurologist.  Reports cervical pain since fall. Increased pain with sitting. Decreased pain with laying down and heat. Denies any numbness and tingling in UEs.       Comments: RTD in 3 months    Objective:   Sensation  Overall Sensation Status: [facility] AROM  Modalities:  Modalities  Moist heat: PRN*  E-stim (parameters): contraindicated secondary to pacemaker for deep brain stimulation  Manual:  Manual therapy  PROM: cervical AROM*  Manual traction: gentle cervical*  Soft Tissue Mobalization: STM subocciptials, cervical paraspinals, upper trap, SCM*  Other: cupping to upper trap and periscapular musculature*  *Indicates exercise,modality, or manual techniques to be initiated when appropriate  Assessment: Body structures, Functions, Activity limitations: Decreased ROM, Decreased strength, Decreased endurance, Increased pain, Decreased posture  Assessment: Patient reports fall on 12/10/19 causing neck pain without relief. Upon PT evaluation, patient demonstrates limited cervical AROM with impaired UE strength. Further skilled PT recommended to decrease pain and improved functional tolerance for overall quality of life. Prognosis: Good  Discharge Recommendations: Continue to assess pending progress        Decision Making: Medium Complexity  History: deep brain stimulator with pacemaker, GERD, cardiac stents  Exam: decreased UE strength, increased cervical pain, decreased posture, decreased neck flexor endurance  Clinical Presentation: evolving        Plan  Frequency/Duration:  Plan  Times per week: 2  Plan weeks: 6  Current Treatment Recommendations: Strengthening, ROM, Endurance Training, Manual Therapy - Soft Tissue Mobilization, Manual Therapy - Joint Manipulation, Neuromuscular Re-education, Home Exercise Program, Safety Education & Training, Patient/Caregiver Education & Training, Modalities         Patient Education  New Education Provided: PT Education: Goals;PT Role;Plan of Care;Home Exercise Program    POST-PAIN     Pain Rating (0-10 pain scale):   4/10  Location and pain description same as pre-treatment unless indicated.    Action: [] NA  [] Call Physician  [x] Perform HEP  [x] Meds as prescribed    Evaluation and patient rights have been reviewed and

## 2020-01-07 NOTE — PLAN OF CARE
[address]     Ph: [phone]  Fax: [phone]    [] Certification  [] Recertification [x]  Plan of Care  [] Progress Note [] Discharge      To:  Odessa Wilcox MD      From:  Serena Berg, LUCHO  Patient: Gómez Blount     : 1960  Diagnosis: Cervical radiculopathy     Date: 2020  Treatment Diagnosis: cervical pain       Progress Report Period from:  2020  to 2020    Total # of Visits to Date: 1   No Show: 0    Canceled Appointment: 0     OBJECTIVE:   Short Term Goals - Time Frame for Short term goals: 4 weeks    Goals Current/Discharge status  Met   Short term goal 1: Patient will report </= 1/10 pain in cervical region with sitting position. Patient reports 4/10 pain in cervical region. [] yes  [x] no   Short term goal 2: Patient will be independent with HEP. Patient will be independent with HEP. [] yes  [x] no     Long Term Goals - Time Frame for Long term goals : 6 weeks  Goals Current/ Discharge status Met   Long term goal 1: Patient will increase cervical AROM >/= 5-10 degrees for functional tolerance. AROM LUE (degrees)  LUE General AROM: shoulder WFLs  Spine  Cervical: flexion 42 deg, ext 15 deg with increased pain, bilateral SB 25 deg with increase in pain, right rotation 41 deg, left rotation 21 deg  AROM RUE (degrees)  RUE General AROM: shoulder WFLs   [] yes  [x] no   Long term goal 2: Patient will increase strength in bilateral UEs >/= 4+/5 for improved lifting tolerance.        Strength RUE  Strength RUE: Exception  R Shoulder Flexion: 4/5  R Shoulder Extension: 4+/5  R Shoulder ABduction: 4/5  R Shoulder Internal Rotation: 4/5  R Shoulder External Rotation: 4-/5  R Elbow Flexion: 4/5  R Elbow Extension: 4-/5  R Wrist Flexion: 4-/5  R Wrist Extension: 4/5  Strength LUE  Strength LUE: Exception  L Shoulder Flexion: 4/5  L Shoulder Extension: 4+/5  L Shoulder ABduction:

## 2020-01-09 ENCOUNTER — HOSPITAL ENCOUNTER (OUTPATIENT)
Dept: PHYSICAL THERAPY | Age: 60
Setting detail: THERAPIES SERIES
Discharge: HOME | End: 2020-01-09
Payer: COMMERCIAL

## 2020-01-09 PROCEDURE — 97140 MANUAL THERAPY 1/> REGIONS: CPT

## 2020-01-09 PROCEDURE — 97110 THERAPEUTIC EXERCISES: CPT

## 2020-01-09 ASSESSMENT — PAIN SCALES - GENERAL: PAINLEVEL_OUTOF10: 0

## 2020-01-14 ENCOUNTER — HOSPITAL ENCOUNTER (OUTPATIENT)
Dept: PHYSICAL THERAPY | Age: 60
Setting detail: THERAPIES SERIES
Discharge: HOME | End: 2020-01-14
Payer: COMMERCIAL

## 2020-01-14 PROCEDURE — 97140 MANUAL THERAPY 1/> REGIONS: CPT

## 2020-01-14 PROCEDURE — 97110 THERAPEUTIC EXERCISES: CPT

## 2020-01-14 ASSESSMENT — PAIN SCALES - GENERAL: PAINLEVEL_OUTOF10: 3

## 2020-01-14 ASSESSMENT — PAIN DESCRIPTION - LOCATION: LOCATION: NECK

## 2020-01-14 ASSESSMENT — PAIN DESCRIPTION - ORIENTATION: ORIENTATION: LOWER

## 2020-01-14 ASSESSMENT — PAIN DESCRIPTION - DESCRIPTORS: DESCRIPTORS: ACHING;DULL

## 2020-01-14 NOTE — PROGRESS NOTES
Decreased endurance, Increased pain, Decreased posture  Assessment: Added UT str and LS str to current exercises with cues to keep within comfortable ROM. Modified chin tucks to supine to improve technique. Continued with cupping for MFD with good tolerance. Treatment Diagnosis: cervical pain  Prognosis: Good     Goals:  Short term goals  Time Frame for Short term goals: 4 weeks  Short term goal 1: Patient will report </= 1/10 pain in cervical region with sitting position. Short term goal 2: Patient will be independent with HEP. Long term goals  Time Frame for Long term goals : 6 weeks  Long term goal 1: Patient will increase cervical AROM >/= 5-10 degrees for functional tolerance. Long term goal 2: Patient will increase strength in bilateral UEs >/= 4+/5 for improved lifting tolerance. Long term goal 3: Patient will demonstrate improved upright posture without verbal cues for improved sitting tolerance. Long term goal 4: NDI </= 14/50 to demonstrate improved functional tolerance. Progress toward goals: Progressing towards all    POST-PAIN       Pain Rating (0-10 pain scale):   3/10   Location and pain description same as pre-treatment unless indicated. Action: [] NA   [x] Perform HEP  [] Meds as prescribed  [] Modalities as prescribed   [] Call Physician     Frequency/Duration:  Plan  Times per week: 2  Plan weeks: 6  Current Treatment Recommendations: Strengthening, ROM, Endurance Training, Manual Therapy - Soft Tissue Mobilization, Manual Therapy - Joint Manipulation, Neuromuscular Re-education, Home Exercise Program, Safety Education & Training, Patient/Caregiver Education & Training, Modalities     Pt to continue current HEP. See objective section for any therapeutic exercise changes, additions or modifications this date.          PT Individual Minutes  Time In: 9744  Time Out: 8785  Minutes: 56  Timed Code Treatment Minutes: 46 Minutes  Procedure Minutes: MH x10 min     Timed Activity Minutes Units

## 2020-01-16 ENCOUNTER — HOSPITAL ENCOUNTER (OUTPATIENT)
Dept: PHYSICAL THERAPY | Age: 60
Setting detail: THERAPIES SERIES
Discharge: HOME | End: 2020-01-16
Payer: COMMERCIAL

## 2020-01-16 PROCEDURE — 97140 MANUAL THERAPY 1/> REGIONS: CPT

## 2020-01-16 PROCEDURE — 97110 THERAPEUTIC EXERCISES: CPT

## 2020-01-16 ASSESSMENT — PAIN DESCRIPTION - LOCATION: LOCATION: NECK

## 2020-01-16 ASSESSMENT — PAIN DESCRIPTION - ORIENTATION: ORIENTATION: LOWER

## 2020-01-16 ASSESSMENT — PAIN SCALES - GENERAL: PAINLEVEL_OUTOF10: 3

## 2020-01-16 ASSESSMENT — PAIN DESCRIPTION - PAIN TYPE: TYPE: ACUTE PAIN

## 2020-01-16 ASSESSMENT — PAIN DESCRIPTION - DESCRIPTORS: DESCRIPTORS: SORE

## 2020-01-16 NOTE — PROGRESS NOTES
*Indicates exercise, modality, or manual techniques to be initiated when appropriate    Assessment: Body structures, Functions, Activity limitations: Decreased ROM, Decreased strength, Decreased endurance, Increased pain, Decreased posture  Assessment: Patient demonstrates increase in cervical ROM this date and reports relief of pain since coming to PT. Continued to work on decreasing upper trap and cervical tightness with manual including cupping on upper trap. Continue per POC. Treatment Diagnosis: cervical pain          Goals:  Short term goals  Time Frame for Short term goals: 4 weeks  Short term goal 1: Patient will report </= 1/10 pain in cervical region with sitting position. Short term goal 2: Patient will be independent with HEP. Long term goals  Time Frame for Long term goals : 6 weeks  Long term goal 1: Patient will increase cervical AROM >/= 5-10 degrees for functional tolerance. Long term goal 2: Patient will increase strength in bilateral UEs >/= 4+/5 for improved lifting tolerance. Long term goal 3: Patient will demonstrate improved upright posture without verbal cues for improved sitting tolerance. Long term goal 4: NDI </= 14/50 to demonstrate improved functional tolerance. Progress toward goals: ROM    POST-PAIN       Pain Rating (0-10 pain scale):   3/10   Location and pain description same as pre-treatment unless indicated. Action: [] NA   [x] Perform HEP  [x] Meds as prescribed  [x] Modalities as prescribed   [] Call Physician     Frequency/Duration:  Plan  Times per week: 2  Plan weeks: 6  Current Treatment Recommendations: Strengthening, ROM, Endurance Training, Manual Therapy - Soft Tissue Mobilization, Manual Therapy - Joint Manipulation, Neuromuscular Re-education, Home Exercise Program, Safety Education & Training, Patient/Caregiver Education & Training, Modalities     Pt to continue current HEP.   See objective section for any therapeutic exercise changes, additions or modifications this date.          PT Individual Minutes  Time In: 0584  Time Out: 1213  Minutes: 54  Timed Code Treatment Minutes: 44 Minutes  Procedure Minutes:10 minutes HP   Timed Activity Minutes Units   Ther Ex  29  2   Manual   15  1       Signature:  Electronically signed by Sage Roldan PT on 1/16/20 at 12:53 PM

## 2020-01-21 ENCOUNTER — HOSPITAL ENCOUNTER (OUTPATIENT)
Dept: PHYSICAL THERAPY | Age: 60
Setting detail: THERAPIES SERIES
Discharge: HOME | End: 2020-01-21
Payer: COMMERCIAL

## 2020-01-21 NOTE — PROGRESS NOTES
[address]       Physical Therapy  Cancellation/No-show Note  Patient Name:  Gómez Blount  :  1960   Date:  2020  Referring Practitioner: Odessa Wilcox MD  Diagnosis: Cervical radiculopathy    Visit Information:  PT Visit Information  Onset Date: 12/10/19  PT Insurance Information: Medical [place]  Total # of Visits Approved: (visits are based off medical necessity; $40 copay)  Total # of Visits to Date: 4  No Show: 0  Canceled Appointment: 1  Progress Note Counter:  Called to cx, sick    For today's appointment patient:  [x]  Cancelled  []  Rescheduled appointment  []  No-show   []  Called pt to remind of next appointment     Reason given by patient:  [x]  Patient ill  []  Conflicting appointment  []  No transportation    []  Conflict with work  []  No reason given  []  Other:       Comments:       Signature: Electronically signed by Rakel Conde PTA on 20 at 10:20 AM

## 2020-01-23 ENCOUNTER — HOSPITAL ENCOUNTER (OUTPATIENT)
Dept: PHYSICAL THERAPY | Age: 60
Setting detail: THERAPIES SERIES
Discharge: HOME | End: 2020-01-23
Payer: COMMERCIAL

## 2020-01-23 PROCEDURE — 97110 THERAPEUTIC EXERCISES: CPT

## 2020-01-23 PROCEDURE — 97140 MANUAL THERAPY 1/> REGIONS: CPT

## 2020-01-23 ASSESSMENT — PAIN DESCRIPTION - ORIENTATION: ORIENTATION: LOWER

## 2020-01-23 ASSESSMENT — PAIN DESCRIPTION - LOCATION: LOCATION: NECK

## 2020-01-23 ASSESSMENT — PAIN SCALES - GENERAL: PAINLEVEL_OUTOF10: 2

## 2020-01-23 ASSESSMENT — PAIN DESCRIPTION - DESCRIPTORS: DESCRIPTORS: ACHING

## 2020-01-23 NOTE — PROGRESS NOTES
[address] [address]  Outpatient Physical Therapy    Treatment Note        Date: 2020  Patient: Edgardo Dee  : 1960  ACCT #: [de-identified]  Referring Practitioner: Cliff Schaefer MD  Diagnosis: Cervical radiculopathy    Visit Information:  PT Visit Information  Onset Date: 12/10/19  PT Insurance Information: Medical [place]  Total # of Visits Approved: (visits are based off medical necessity; $40 copay)  Total # of Visits to Date: 5  No Show: 0  Canceled Appointment: 1  Progress Note Counter:      Subjective: Pt reports feels therapy has been helping thus far. Reports compliance with HEP. Comments: RTD in 3 months  HEP Compliance:  [x] Good [] Fair [] Poor [] Reports not doing due to:    Vital Signs  Patient Currently in Pain: Yes   Pain Screening  Patient Currently in Pain: Yes  Pain Assessment  Pain Assessment: 0-10  Pain Level: 2  Pain Location: Neck  Pain Orientation: Lower  Pain Descriptors: Aching    OBJECTIVE:   Exercises  Exercise 2: UE ergometer L1.5 forward x 2.5 minutes, retro x 2.5 minutes  Exercise 3: upper trap stretch 30 sec x 3 b/l without overpressure  Exercise 4: levator scap stretch 30 sec x 3 b/l without overpressure  Exercise 5: Posterior shoulder rolls x 15, scapular retraction 5 sec x 15  Exercise 6: supine chin tucks 5 sec x 15 with towel roll  Exercise 7: YTB rows/lats x 10  Exercise 8: Supine YTB shoulder IR/ER chest pulls x10 ea  Exercise 20: HEP: rows/lats     Manual:   Manual therapy  Manual traction: gentle cervical  Soft Tissue Mobalization: STM subocciptials, cervical paraspinals, upper trap   Other: total manual 10 minutes    Modalities:  Modalities  Moist heat: HP to cervical x 10 minutes to decreased tightness and pain     *Indicates exercise, modality, or manual techniques to be initiated when appropriate    Assessment:    Body structures, Functions, Activity limitations: Decreased ROM, Decreased strength, Decreased endurance, Increased pain, Decreased posture  Assessment: Added supine chest pulls with good tolerance. Continues to demonstrate mild guarding and increased cervical mm tneion with manual, though with relief with gentle cervical traction. Treatment Diagnosis: cervical pain  Prognosis: Good     Goals:  Short term goals  Time Frame for Short term goals: 4 weeks  Short term goal 1: Patient will report </= 1/10 pain in cervical region with sitting position. Short term goal 2: Patient will be independent with HEP. Long term goals  Time Frame for Long term goals : 6 weeks  Long term goal 1: Patient will increase cervical AROM >/= 5-10 degrees for functional tolerance. Long term goal 2: Patient will increase strength in bilateral UEs >/= 4+/5 for improved lifting tolerance. Long term goal 3: Patient will demonstrate improved upright posture without verbal cues for improved sitting tolerance. Long term goal 4: NDI </= 14/50 to demonstrate improved functional tolerance. Progress toward goals: Progressing towards all    POST-PAIN       Pain Rating (0-10 pain scale):   1/10   Location and pain description same as pre-treatment unless indicated. Action: [] NA   [x] Perform HEP  [] Meds as prescribed  [] Modalities as prescribed   [] Call Physician     Frequency/Duration:  Plan  Times per week: 2  Plan weeks: 6  Current Treatment Recommendations: Strengthening, ROM, Endurance Training, Manual Therapy - Soft Tissue Mobilization, Manual Therapy - Joint Manipulation, Neuromuscular Re-education, Home Exercise Program, Safety Education & Training, Patient/Caregiver Education & Training, Modalities     Pt to continue current HEP. See objective section for any therapeutic exercise changes, additions or modifications this date.           PT Individual Minutes  Time In: 9617  Time Out: 1303  Minutes: 50  Timed Code Treatment Minutes: 40 Minutes  Procedure Minutes: MH x10 min     Timed Activity Minutes Units   Ther Ex 30 2   Manual  10 1       Signature: Electronically signed by Homer Glover PTA on 1/23/20 at 11:33 AM

## 2020-01-27 ENCOUNTER — HOSPITAL ENCOUNTER (OUTPATIENT)
Dept: PHYSICAL THERAPY | Age: 60
Setting detail: THERAPIES SERIES
Discharge: HOME | End: 2020-01-27
Payer: COMMERCIAL

## 2020-01-27 PROCEDURE — 97110 THERAPEUTIC EXERCISES: CPT

## 2020-01-27 PROCEDURE — 97140 MANUAL THERAPY 1/> REGIONS: CPT

## 2020-01-27 ASSESSMENT — PAIN DESCRIPTION - ORIENTATION: ORIENTATION: LEFT

## 2020-01-27 ASSESSMENT — PAIN SCALES - GENERAL: PAINLEVEL_OUTOF10: 5

## 2020-01-27 ASSESSMENT — PAIN DESCRIPTION - DESCRIPTORS: DESCRIPTORS: SORE

## 2020-01-27 ASSESSMENT — PAIN DESCRIPTION - LOCATION: LOCATION: NECK;ARM

## 2020-01-27 ASSESSMENT — PAIN DESCRIPTION - PAIN TYPE: TYPE: ACUTE PAIN

## 2020-01-27 NOTE — PROGRESS NOTES
[address] [address]  Outpatient Physical Therapy    Treatment Note        Date: 2020  Patient: Radha Chaudhry  : 1960  ACCT #: [de-identified]  Referring Practitioner: Parris Montanez MD  Diagnosis: Cervical radiculopathy    Visit Information:  PT Visit Information  Onset Date: 12/10/19  PT Insurance Information: Medical [place]  Total # of Visits Approved: (visits are based off medical necessity; $40 copay)  Total # of Visits to Date: 6  No Show: 0  Canceled Appointment: 1  Progress Note Counter:     Subjective: Pt reports soreness in cervical/Lt UE over the weekend. Reports cupping was not performed last visit and could be why she was sore hours after session. Min discomfort with lats/row HEP at home.    Comments: RTD in 3 months  HEP Compliance:  [x] Good [] Fair [] Poor [] Reports not doing due to:    Vital Signs  Patient Currently in Pain: Yes   Pain Screening  Patient Currently in Pain: Yes  Pain Assessment  Pain Level: 5  Pain Type: Acute pain  Pain Location: Neck;Arm  Pain Orientation: Left  Pain Descriptors: Sore    OBJECTIVE:   Exercises  Exercise 2: UE ergometer L1.8 forward x 2.5 minutes, retro x 2.5 minutes  Exercise 3: upper trap stretch 30 sec x 2b/l without overpressure (reviewed for HEP)  Exercise 4: levator scap stretch 30 sec x 2 b/l without overpressure(reviewed for HEP)  Exercise 6: supine chin tucks 5 sec x 15 with towel roll- VCs to improve technique  Exercise 8: Supine YTB shoulder IR/ER chest pulls x10 ea  Exercise 9: Corner str 3/30sec to improve posture            Manual:   Manual therapy  Manual traction: gentle cervical  Soft Tissue Mobalization: STM subocciptials, cervical paraspinals, upper trap   Other: Cupping to UE and thoracic paraspinals: static holds and gliding to improve muscle tightness and circulation to decrease pain: 15min total    Modalities:  Modalities  Moist heat: HP to cervical x 10 minutes to decreased tightness and pain  E-stim (parameters): contraindicated secondary to pacemaker for deep brain stimulation     *Indicates exercise, modality, or manual techniques to be initiated when appropriate    Assessment: Body structures, Functions, Activity limitations: Decreased ROM, Decreased strength, Decreased endurance, Increased pain, Decreased posture  Assessment: Initiated corner stretch to improve flexibility and improve overall posture. Continued cupping to improve muscle tightness and decrease pain. Pt requires VCs to improve technique with chin tucks, demo'ing good carry over with instruction. Discussed hand placement with lats/row exercise on Tband to decrease tension and improve technique. Treatment Diagnosis: cervical pain  Prognosis: Good       Goals:  Short term goals  Time Frame for Short term goals: 4 weeks  Short term goal 1: Patient will report </= 1/10 pain in cervical region with sitting position. Short term goal 2: Patient will be independent with HEP. Long term goals  Time Frame for Long term goals : 6 weeks  Long term goal 1: Patient will increase cervical AROM >/= 5-10 degrees for functional tolerance. Long term goal 2: Patient will increase strength in bilateral UEs >/= 4+/5 for improved lifting tolerance. Long term goal 3: Patient will demonstrate improved upright posture without verbal cues for improved sitting tolerance. Long term goal 4: NDI </= 14/50 to demonstrate improved functional tolerance. Progress toward goals: Strength, ROM    POST-PAIN       Pain Rating (0-10 pain scale):   0/10   Location and pain description same as pre-treatment unless indicated.    Action: [x] NA   [] Perform HEP  [] Meds as prescribed  [] Modalities as prescribed   [] Call Physician     Frequency/Duration:  Plan  Times per week: 2  Plan weeks: 6  Current Treatment Recommendations: Strengthening, ROM, Endurance Training, Manual Therapy - Soft Tissue Mobilization, Manual Therapy - Joint Manipulation, Neuromuscular Re-education, Home Exercise Program, Safety Education & Training, Patient/Caregiver Education & Training, Modalities     Pt to continue current HEP. See objective section for any therapeutic exercise changes, additions or modifications this date.          PT Individual Minutes  Time In: 1120  Time Out: 1210  Minutes: 50  Timed Code Treatment Minutes: 40 Minutes  Procedure Minutes:10- HP     Timed Activity Minutes Units   Ther Ex 25 2   Manual  15 1       Signature:  Electronically signed by Ly Orosco PTA on 1/27/20 at 11:27 AM

## 2020-01-30 ENCOUNTER — HOSPITAL ENCOUNTER (OUTPATIENT)
Dept: PHYSICAL THERAPY | Age: 60
Setting detail: THERAPIES SERIES
Discharge: HOME | End: 2020-01-30
Payer: COMMERCIAL

## 2020-01-30 NOTE — PROGRESS NOTES
[address]       Physical Therapy  Cancellation/No-show Note  Patient Name:  Darling Munguia  :  1960   Date:  2020  Referring Practitioner: Nazario Roth MD  Diagnosis: Cervical radiculopathy    Visit Information:  PT Visit Information  Onset Date: 12/10/19  PT Insurance Information: Medical [place]  Total # of Visits Approved: (visits are based off medical necessity; $40 copay)  Total # of Visits to Date: 6  No Show: 0  Canceled Appointment: 2  Progress Note Counter:  (cx 20)    For today's appointment patient:  [x]  Cancelled  []  Rescheduled appointment  []  No-show   []  Called pt to remind of next appointment     Reason given by patient:  [x]  Patient ill  []  Conflicting appointment  []  No transportation    []  Conflict with work  []  No reason given  []  Other:       Comments:       Signature: Electronically signed by Antoinette Jaramillo PT on 20 at 10:41 AM

## 2020-02-04 ENCOUNTER — HOSPITAL ENCOUNTER (OUTPATIENT)
Dept: PHYSICAL THERAPY | Age: 60
Setting detail: THERAPIES SERIES
Discharge: HOME | End: 2020-02-04
Payer: COMMERCIAL

## 2020-02-04 PROCEDURE — 97140 MANUAL THERAPY 1/> REGIONS: CPT

## 2020-02-04 PROCEDURE — 97110 THERAPEUTIC EXERCISES: CPT

## 2020-02-04 NOTE — PROGRESS NOTES
[address] [address]  Outpatient Physical Therapy    Treatment Note        Date: 2020  Patient: Ellis Armstrong  : 1960  ACCT #: [de-identified]  Referring Practitioner: Tamir Rai MD  Diagnosis: Cervical radiculopathy    Visit Information:  PT Visit Information  Onset Date: 12/10/19  PT Insurance Information: Medical [place]  Total # of Visits Approved: (visits are based off medical necessity; $40 copay)  Total # of Visits to Date: 7  No Show: 0  Canceled Appointment: 2  Progress Note Counter:     Subjective: Pt reports nio pain, had some stiffness over the weekend. Comments: RTD in 3 months  HEP Compliance:  [x] Good [] Fair [] Poor [] Reports not doing due to:    Vital Signs  Patient Currently in Pain: Denies   Pain Screening  Patient Currently in Pain: Denies    OBJECTIVE:   Exercises  Exercise 2: UE ergometer L2.0 forward x 2.5 minutes, retro x 2.5 minutes  Exercise 5: Posterior shoulder rolls x 20, scapular retraction 5 sec x 20  Exercise 6: supine chin tucks 5 sec x 15 with towel roll-   Exercise 7: YTB rows/lats x 15  Exercise 8: Supine YTB shoulder IR/ER chest pulls x15 ea  Exercise 9: Corner str 3/30sec to improve posture  Exercise 10: B ER YTB x 10          Strength: [x] NT  [] MMT completed:     ROM: [x] NT  [] ROM measurements:         Manual:   Manual therapy  Manual traction: gentle cervical  Soft Tissue Mobalization: STM subocciptials, cervical paraspinals, upper trap   Other: Cupping to UE and thoracic paraspinals: static holds and gliding to improve muscle tightness and circulation to decrease pain: 16min total    Modalities:  Modalities  Moist heat: HP to cervical x 10 minutes to decreased tightness and pain  E-stim (parameters): contraindicated secondary to pacemaker for deep brain stimulation     *Indicates exercise, modality, or manual techniques to be initiated when appropriate    Assessment:         Body structures, Functions, Activity limitations: Decreased ROM, Decreased strength, Decreased endurance, Increased pain, Decreased posture  Assessment: Initiated Adelfo. ER with band and progressed current exercises. demonstrates good understanding of exercises program. Mild tighness Adelfo cervical musculature decreased with manual techniques. Good tolerance to treatment. Treatment Diagnosis: cervical pain  Prognosis: Good       Goals:  Short term goals  Time Frame for Short term goals: 4 weeks  Short term goal 1: Patient will report </= 1/10 pain in cervical region with sitting position. Short term goal 2: Patient will be independent with HEP. Long term goals  Time Frame for Long term goals : 6 weeks  Long term goal 1: Patient will increase cervical AROM >/= 5-10 degrees for functional tolerance. Long term goal 2: Patient will increase strength in bilateral UEs >/= 4+/5 for improved lifting tolerance. Long term goal 3: Patient will demonstrate improved upright posture without verbal cues for improved sitting tolerance. Long term goal 4: NDI </= 14/50 to demonstrate improved functional tolerance. Progress toward goals: on going. POST-PAIN       Pain Rating (0-10 pain scale):  0 /10   Location and pain description same as pre-treatment unless indicated. Action: [] NA   [x] Perform HEP  [] Meds as prescribed  [] Modalities as prescribed   [] Call Physician     Frequency/Duration:  Plan  Times per week: 2  Plan weeks: 6  Current Treatment Recommendations: Strengthening, ROM, Endurance Training, Manual Therapy - Soft Tissue Mobilization, Manual Therapy - Joint Manipulation, Neuromuscular Re-education, Home Exercise Program, Safety Education & Training, Patient/Caregiver Education & Training, Modalities     Pt to continue current HEP. See objective section for any therapeutic exercise changes, additions or modifications this date.          PT Individual Minutes  Time In: 1120  Time Out: 0163  Minutes: 51  Timed Code Treatment Minutes: 40 Minutes  Procedure Minutes: HP 10     Timed Activity Minutes Units   Ther Ex 24 2   Manual  16 1       Signature:  Electronically signed by Karen Crane PTA on 2/4/20 at 12:11 PM

## 2020-02-06 ENCOUNTER — HOSPITAL ENCOUNTER (OUTPATIENT)
Dept: PHYSICAL THERAPY | Age: 60
Setting detail: THERAPIES SERIES
Discharge: HOME | End: 2020-02-06
Payer: COMMERCIAL

## 2020-02-06 NOTE — PROGRESS NOTES
[address]       Physical Therapy  Cancellation/No-show Note  Patient Name:  Steve Suarez  :  1960   Date:  2020  Referring Practitioner: Ed Calle MD  Diagnosis: Cervical radiculopathy    Visit Information:  PT Visit Information  Onset Date: 12/10/19  PT Insurance Information: Medical [place]  Total # of Visits Approved: (visits are based off medical necessity; $40 copay)  Total # of Visits to Date: 7  No Show: 0  Canceled Appointment: 3  Progress Note Counter:  (cx 20)    For today's appointment patient:  [x]  Cancelled  []  Rescheduled appointment  []  No-show   []  Called pt to remind of next appointment     Reason given by patient:  [x]  Patient ill  []  Conflicting appointment  []  No transportation    []  Conflict with work  []  No reason given  []  Other:       Comments:       Signature: Electronically signed by Kami Espana PT on 20 at 11:25 AM

## 2020-02-11 ENCOUNTER — HOSPITAL ENCOUNTER (OUTPATIENT)
Dept: PHYSICAL THERAPY | Age: 60
Setting detail: THERAPIES SERIES
Discharge: HOME | End: 2020-02-11
Payer: COMMERCIAL

## 2020-02-12 ENCOUNTER — OFFICE VISIT (OUTPATIENT)
Dept: CARDIOLOGY CLINIC | Age: 60
End: 2020-02-12
Payer: COMMERCIAL

## 2020-02-12 VITALS
DIASTOLIC BLOOD PRESSURE: 72 MMHG | HEART RATE: 78 BPM | RESPIRATION RATE: 18 BRPM | SYSTOLIC BLOOD PRESSURE: 114 MMHG | WEIGHT: 234.2 LBS | BODY MASS INDEX: 44.25 KG/M2 | OXYGEN SATURATION: 95 %

## 2020-02-12 PROCEDURE — 1036F TOBACCO NON-USER: CPT | Performed by: INTERNAL MEDICINE

## 2020-02-12 PROCEDURE — G8484 FLU IMMUNIZE NO ADMIN: HCPCS | Performed by: INTERNAL MEDICINE

## 2020-02-12 PROCEDURE — 3017F COLORECTAL CA SCREEN DOC REV: CPT | Performed by: INTERNAL MEDICINE

## 2020-02-12 PROCEDURE — G8417 CALC BMI ABV UP PARAM F/U: HCPCS | Performed by: INTERNAL MEDICINE

## 2020-02-12 PROCEDURE — 99214 OFFICE O/P EST MOD 30 MIN: CPT | Performed by: INTERNAL MEDICINE

## 2020-02-12 PROCEDURE — 93000 ELECTROCARDIOGRAM COMPLETE: CPT | Performed by: INTERNAL MEDICINE

## 2020-02-12 PROCEDURE — G8427 DOCREV CUR MEDS BY ELIG CLIN: HCPCS | Performed by: INTERNAL MEDICINE

## 2020-02-12 RX ORDER — CYCLOBENZAPRINE HCL 5 MG
5 TABLET ORAL NIGHTLY PRN
COMMUNITY

## 2020-02-12 ASSESSMENT — ENCOUNTER SYMPTOMS
BLOOD IN STOOL: 0
STRIDOR: 0
COUGH: 0
NAUSEA: 0
WHEEZING: 0
SHORTNESS OF BREATH: 1
EYES NEGATIVE: 1
CHEST TIGHTNESS: 0
GASTROINTESTINAL NEGATIVE: 1

## 2020-02-12 NOTE — PROGRESS NOTES
Subsequent Progress Note  Patient: Kimberly Hurst  YOB: 1960  MRN: [id number]    Chief Complaint:wagner htn psvt lipid   Chief Complaint   Patient presents with    6 Month Follow-Up    Hypertension    Tachycardia     PSVT    Other     LBBB       CV Data:  2/2017 echo 55%  7/2016  Deep Brain Stimulator for tremors.  (device is located Left Pectoral)- followed by Gab Beebe  1/2019 spect negative    Subjective/HPI: some wagner no cp no falls no bleed takes meds walks     2/12/2020 had 1 episode fall and evaluated in ER. No cp no sob no bleed.     nonsmoker  Retired     EKG: SR 76 LBBB    Past Medical History:   Diagnosis Date    Acute serous otitis media     Asthma     Cellulitis     of left toe    Chronic depression     CTS (carpal tunnel syndrome)     Essential hypertension 8/14/2019    Hyperlipidemia     Lumbar radiculopathy     PSVT (paroxysmal supraventricular tachycardia) (HCC)     Seasonal allergic rhinitis     Tremor     bilateral, nonspecific        Past Surgical History:   Procedure Laterality Date    BACK SURGERY      CARDIAC CATHETERIZATION  2008    Micro vascular angina       Family History   Problem Relation Age of Onset    [de-identified] / Stillbirths Mother     Early Death Father     Heart Disease Father     High Blood Pressure Father        Social History     Socioeconomic History    Marital status:      Spouse name: None    Number of children: None    Years of education: None    Highest education level: None   Occupational History    None   Social Needs    Financial resource strain: None    Food insecurity:     Worry: None     Inability: None    Transportation needs:     Medical: None     Non-medical: None   Tobacco Use    Smoking status: Never Smoker    Smokeless tobacco: Never Used   Substance and Sexual Activity    Alcohol use: Yes     Comment: very rare    Drug use: Never    Sexual activity: Not Currently   Lifestyle    Physical HCl (BENADRYL ALLERGY PO) Take  by mouth as needed.  aspirin 81 MG EC tablet Take 81 mg by mouth daily.  budesonide-formoterol (SYMBICORT) 160-4.5 MCG/ACT AERO Inhale 2 puffs into the lungs 2 times daily       No current facility-administered medications for this visit. Review of Systems:   Review of Systems   Constitutional: Negative. Negative for diaphoresis and fatigue. HENT: Negative. Eyes: Negative. Respiratory: Positive for shortness of breath. Negative for cough, chest tightness, wheezing and stridor. Cardiovascular: Negative. Negative for chest pain, palpitations and leg swelling. Gastrointestinal: Negative. Negative for blood in stool and nausea. Genitourinary: Negative. Musculoskeletal: Negative. Skin: Negative. Neurological: Positive for tremors and light-headedness. Negative for dizziness, syncope and weakness. Hematological: Negative. Psychiatric/Behavioral: Negative. Physical Examination:    /72 (Site: Right Upper Arm, Position: Sitting, Cuff Size: Large Adult)   Pulse 78   Resp 18   Wt 234 lb 3.2 oz (106.2 kg)   SpO2 95%   BMI 44.25 kg/m²    Physical Exam   Constitutional: She appears healthy. No distress. HENT:   Normal cephalic and Atraumatic   Eyes: Pupils are equal, round, and reactive to light. Neck: Normal range of motion and thyroid normal. Neck supple. No JVD present. No neck adenopathy. No thyromegaly present. Cardiovascular: Normal rate, regular rhythm, intact distal pulses and normal pulses. Murmur heard. Pulmonary/Chest: Effort normal and breath sounds normal. She has no wheezes. She has no rales. She exhibits no tenderness. Abdominal: Soft. Bowel sounds are normal. There is no abdominal tenderness. Musculoskeletal: Normal range of motion. General: No tenderness or edema. Neurological: She is alert and oriented to person, place, and time. Skin: Skin is warm. No cyanosis. Nails show no clubbing. LABS:  CBC:   Lab Results   Component Value Date    WBC 6.4 12/17/2019    RBC 4.13 12/17/2019    HGB 13.9 12/17/2019    HCT 40.1 12/17/2019    MCV 97.0 12/17/2019    MCH 33.7 12/17/2019    MCHC 34.7 12/17/2019    RDW 13.1 12/17/2019     12/17/2019     Lipids:  Lab Results   Component Value Date    CHOL 175 09/17/2012     Lab Results   Component Value Date    TRIG 187 (H) 09/17/2012     Lab Results   Component Value Date    HDL 56 09/17/2012     Lab Results   Component Value Date    LDLCALC 89 09/17/2012     No results found for: LABVLDL, VLDL  Lab Results   Component Value Date    CHOLHDLRATIO 3.1 09/17/2012     CMP:    Lab Results   Component Value Date     12/17/2019    K 4.2 12/17/2019     12/17/2019    CO2 25 12/17/2019    BUN 9 12/17/2019    CREATININE 0.53 12/17/2019    GFRAA >60.0 12/17/2019    LABGLOM >60.0 12/17/2019    GLUCOSE 140 12/17/2019    PROT 7.6 12/17/2019    LABALBU 4.2 12/17/2019    LABALBU 4.5 03/28/2012    CALCIUM 9.1 12/17/2019    BILITOT 0.3 12/17/2019    ALKPHOS 175 12/17/2019    AST 36 12/17/2019    ALT 29 12/17/2019     BMP:    Lab Results   Component Value Date     12/17/2019    K 4.2 12/17/2019     12/17/2019    CO2 25 12/17/2019    BUN 9 12/17/2019    LABALBU 4.2 12/17/2019    LABALBU 4.5 03/28/2012    CREATININE 0.53 12/17/2019    CALCIUM 9.1 12/17/2019    GFRAA >60.0 12/17/2019    LABGLOM >60.0 12/17/2019    GLUCOSE 140 12/17/2019     Magnesium:  No results found for: MG  TSH:No results found for: TSHFT4, TSH    Patient Active Problem List   Diagnosis    Cellulitis    Acute serous otitis media    Asthma    Chronic depression    Tremors of nervous system    Seasonal allergic rhinitis    CTS (carpal tunnel syndrome)    Lumbar radiculopathy    Hyperlipidemia    PSVT (paroxysmal supraventricular tachycardia) (HCC)    Left bundle branch block    SOB (shortness of breath)    Morbid obesity, unspecified obesity type ([facility]    Pure hypercholesterolemia    Shortness of breath    Essential hypertension    Benign essential tremor    Benign paroxysmal positional vertigo       Medications Discontinued During This Encounter   Medication Reason    cyclobenzaprine (FLEXERIL) 10 MG tablet DOSE ADJUSTMENT    promethazine (PHENERGAN) 25 MG tablet LIST CLEANUP       Modified Medications    No medications on file       No orders of the defined types were placed in this encounter. Assessment/Plan:    1. PSVT (paroxysmal supraventricular tachycardia) (HCC)  stabale     2. Left bundle branch block  Chronic     3. Pure hypercholesterolemia   statin     4. Shortness of breath   stable   F/u Echo     5. Essential hypertension   stable on meds. 6. Dizzy- CUS. Counseling:  Heart Healthy Lifestyle, Improve BMI, Low Salt Diet, Take Precautions to Prevent Falls and Walk Daily    Return in about 6 months (around 8/12/2020) for Cardiovascular care. .      Electronically signed by Brandi Ramey MD on 2/12/2020 at 1:39 PM

## 2020-02-13 ENCOUNTER — HOSPITAL ENCOUNTER (OUTPATIENT)
Dept: PHYSICAL THERAPY | Age: 60
Setting detail: THERAPIES SERIES
Discharge: HOME | End: 2020-02-13
Payer: COMMERCIAL

## 2020-03-06 ENCOUNTER — HOSPITAL ENCOUNTER (OUTPATIENT)
Dept: ULTRASOUND IMAGING | Age: 60
Discharge: HOME | End: 2020-03-08
Payer: COMMERCIAL

## 2020-03-06 ENCOUNTER — HOSPITAL ENCOUNTER (OUTPATIENT)
Dept: NON INVASIVE DIAGNOSTICS | Age: 60
Discharge: HOME | End: 2020-03-06
Payer: COMMERCIAL

## 2020-03-06 LAB
LV EF: 60 %
LVEF MODALITY: NORMAL

## 2020-03-06 PROCEDURE — 93880 EXTRACRANIAL BILAT STUDY: CPT

## 2020-03-06 PROCEDURE — 93880 EXTRACRANIAL BILAT STUDY: CPT | Performed by: INTERNAL MEDICINE

## 2020-03-06 PROCEDURE — 93306 TTE W/DOPPLER COMPLETE: CPT

## 2020-03-09 ENCOUNTER — CLINICAL DOCUMENTATION (OUTPATIENT)
Dept: PHYSICAL THERAPY | Age: 60
End: 2020-03-09

## 2020-03-23 ENCOUNTER — TELEPHONE (OUTPATIENT)
Dept: CARDIOLOGY CLINIC | Age: 60
End: 2020-03-23

## 2020-04-03 NOTE — TELEPHONE ENCOUNTER
Requested Prescriptions     Pending Prescriptions Disp Refills    meclizine (ANTIVERT) 25 MG tablet 180 tablet 3     Sig: Take 1 tablet by mouth 2 times daily

## 2020-04-06 RX ORDER — MECLIZINE HYDROCHLORIDE 25 MG/1
25 TABLET ORAL 2 TIMES DAILY
Qty: 180 TABLET | Refills: 3 | Status: SHIPPED | OUTPATIENT
Start: 2020-04-06 | End: 2021-04-05 | Stop reason: SDUPTHER

## 2020-04-21 ENCOUNTER — VIRTUAL VISIT (OUTPATIENT)
Dept: NEUROLOGY | Age: 60
End: 2020-04-21
Payer: COMMERCIAL

## 2020-04-21 PROBLEM — R19.5 NONSPECIFIC ABNORMAL FINDING IN STOOL CONTENTS: Status: ACTIVE | Noted: 2019-01-29

## 2020-04-21 PROBLEM — Z90.49 ACQUIRED ABSENCE OF OTHER SPECIFIED PARTS OF DIGESTIVE TRACT: Status: ACTIVE | Noted: 2017-09-26

## 2020-04-21 PROBLEM — Z87.42 PERSONAL HISTORY OF OTHER DISEASES OF THE FEMALE GENITAL TRACT: Status: ACTIVE | Noted: 2017-09-26

## 2020-04-21 PROBLEM — K59.00 CONSTIPATION: Status: ACTIVE | Noted: 2019-01-29

## 2020-04-21 PROBLEM — E66.8 CONSTITUTIONAL OBESITY: Status: ACTIVE | Noted: 2019-08-09

## 2020-04-21 PROBLEM — E66.89 CONSTITUTIONAL OBESITY: Status: ACTIVE | Noted: 2019-08-09

## 2020-04-21 PROBLEM — T78.00XD: Status: ACTIVE | Noted: 2019-08-09

## 2020-04-21 PROBLEM — I42.9 CARDIOMYOPATHY (HCC): Status: ACTIVE | Noted: 2018-01-10

## 2020-04-21 PROBLEM — J45.40 ASTHMA, MODERATE PERSISTENT, POORLY-CONTROLLED: Status: ACTIVE | Noted: 2019-08-09

## 2020-04-21 PROBLEM — Z98.1 ARTHRODESIS STATUS: Status: ACTIVE | Noted: 2017-09-26

## 2020-04-21 PROBLEM — M54.9 DORSALGIA, UNSPECIFIED: Status: ACTIVE | Noted: 2017-09-26

## 2020-04-21 PROBLEM — G56.00 CARPAL TUNNEL SYNDROME: Status: ACTIVE | Noted: 2017-09-26

## 2020-04-21 PROBLEM — I44.7 LBBB (LEFT BUNDLE BRANCH BLOCK): Status: ACTIVE | Noted: 2017-09-26

## 2020-04-21 PROBLEM — Z01.818 ENCOUNTER FOR OTHER PREPROCEDURAL EXAMINATION: Status: ACTIVE | Noted: 2017-09-14

## 2020-04-21 PROBLEM — I25.10 ATHEROSCLEROSIS OF NATIVE CORONARY ARTERY OF NATIVE HEART WITHOUT ANGINA PECTORIS: Status: ACTIVE | Noted: 2017-09-26

## 2020-04-21 PROBLEM — G25.0 ESSENTIAL TREMOR: Status: ACTIVE | Noted: 2019-05-02

## 2020-04-21 PROBLEM — J30.89 ALLERGIC RHINITIS DUE TO DUST MITE: Status: ACTIVE | Noted: 2019-08-09

## 2020-04-21 PROBLEM — T78.1XXA ADVERSE FOOD REACTION: Status: ACTIVE | Noted: 2019-08-09

## 2020-04-21 PROBLEM — M19.90 UNSPECIFIED OSTEOARTHRITIS, UNSPECIFIED SITE: Status: ACTIVE | Noted: 2017-09-26

## 2020-04-21 PROBLEM — Z12.11 SCREENING FOR COLON CANCER: Status: ACTIVE | Noted: 2019-01-29

## 2020-04-21 PROBLEM — R42 VERTIGO: Status: ACTIVE | Noted: 2018-01-10

## 2020-04-21 PROBLEM — Z86.2: Status: ACTIVE | Noted: 2017-09-26

## 2020-04-21 PROBLEM — R06.83 SNORING: Status: ACTIVE | Noted: 2019-08-09

## 2020-04-21 PROBLEM — F32.A CHRONIC DEPRESSION: Status: ACTIVE | Noted: 2017-09-26

## 2020-04-21 PROBLEM — R06.00 DYSPNEA: Status: ACTIVE | Noted: 2018-01-03

## 2020-04-21 PROBLEM — Z79.82 LONG TERM (CURRENT) USE OF ASPIRIN: Status: ACTIVE | Noted: 2017-09-26

## 2020-04-21 PROBLEM — R94.31 ABNORMAL FINDING ON EKG: Status: ACTIVE | Noted: 2017-09-26

## 2020-04-21 PROBLEM — J30.81 ALLERGIC RHINITIS DUE TO ANIMALS: Status: ACTIVE | Noted: 2019-08-09

## 2020-04-21 PROBLEM — K22.9 ESOPHAGEAL DISORDER: Status: ACTIVE | Noted: 2017-09-26

## 2020-04-21 PROCEDURE — 99443 PR PHYS/QHP TELEPHONE EVALUATION 21-30 MIN: CPT | Performed by: PSYCHIATRY & NEUROLOGY

## 2020-04-21 ASSESSMENT — ENCOUNTER SYMPTOMS
VOMITING: 0
SHORTNESS OF BREATH: 0
NAUSEA: 0
CHOKING: 0
TROUBLE SWALLOWING: 0
BACK PAIN: 0
PHOTOPHOBIA: 0

## 2020-05-21 PROBLEM — Z12.11 SCREENING FOR COLON CANCER: Status: RESOLVED | Noted: 2019-01-29 | Resolved: 2020-05-21

## 2020-05-21 PROBLEM — Z01.818 ENCOUNTER FOR OTHER PREPROCEDURAL EXAMINATION: Status: RESOLVED | Noted: 2017-09-14 | Resolved: 2020-05-21

## 2020-08-20 ENCOUNTER — VIRTUAL VISIT (OUTPATIENT)
Dept: CARDIOLOGY CLINIC | Age: 60
End: 2020-08-20
Payer: COMMERCIAL

## 2020-08-20 PROBLEM — I47.10 PSVT (PAROXYSMAL SUPRAVENTRICULAR TACHYCARDIA): Status: ACTIVE | Noted: 2020-08-20

## 2020-08-20 PROBLEM — I44.7 LEFT BUNDLE BRANCH BLOCK: Status: ACTIVE | Noted: 2020-08-20

## 2020-08-20 PROBLEM — F41.9 ANXIETY: Status: ACTIVE | Noted: 2020-08-20

## 2020-08-20 PROBLEM — I47.1 PSVT (PAROXYSMAL SUPRAVENTRICULAR TACHYCARDIA) (HCC): Status: ACTIVE | Noted: 2020-08-20

## 2020-08-20 PROBLEM — R42 DIZZY: Status: ACTIVE | Noted: 2020-08-20

## 2020-08-20 PROCEDURE — G8428 CUR MEDS NOT DOCUMENT: HCPCS | Performed by: INTERNAL MEDICINE

## 2020-08-20 PROCEDURE — 3017F COLORECTAL CA SCREEN DOC REV: CPT | Performed by: INTERNAL MEDICINE

## 2020-08-20 PROCEDURE — 99214 OFFICE O/P EST MOD 30 MIN: CPT | Performed by: INTERNAL MEDICINE

## 2020-08-20 ASSESSMENT — ENCOUNTER SYMPTOMS
CHEST TIGHTNESS: 0
EYES NEGATIVE: 1
WHEEZING: 0
STRIDOR: 0
SHORTNESS OF BREATH: 1
COUGH: 0
BLOOD IN STOOL: 0
NAUSEA: 0
GASTROINTESTINAL NEGATIVE: 1

## 2020-08-20 NOTE — PROGRESS NOTES
Subsequent Progress Note  Patient: Alia Shay  YOB: 1960  MRN: [id number]    Chief Complaint:wagner htn psvt lipid   Chief Complaint   Patient presents with    Hypertension       CV Data:  2/2017 echo 55%  7/2016  Deep Brain Stimulator for tremors.  (device is located Left Pectoral)- followed by Arabella Jean  1/2019 spect negative  3/2020 Echo EF 60  3/2020 CUS negative    Subjective/HPI: some wagner no cp no falls no bleed takes meds walks     2/12/2020 had 1 episode fall and evaluated in ER. No cp no sob no bleed. 8/20/2020 TELEHEALTH EVALUATION -- Audio/Visual (During [id number] public health emergency)    Doing well still has tremors but stable. No sob. occ cp - ill defined. No bleed no falls.  Takes meds walks     nonsmoker  Retired     EKG: SR 76 LBBB    Past Medical History:   Diagnosis Date    Acute serous otitis media     Asthma     Atherosclerosis of native coronary artery of native heart without angina pectoris 9/26/2017    Cellulitis     of left toe    Chronic depression     CTS (carpal tunnel syndrome)     Essential hypertension 8/14/2019    Hyperlipidemia     Lumbar radiculopathy     PSVT (paroxysmal supraventricular tachycardia) ([facility]     Seasonal allergic rhinitis     Tremor     bilateral, nonspecific        Past Surgical History:   Procedure Laterality Date    BACK SURGERY      CARDIAC CATHETERIZATION  2008    Micro vascular angina       Family History   Problem Relation Age of Onset    Miscarriages / Stillbirths Mother     Early Death Father     Heart Disease Father     High Blood Pressure Father        Social History     Socioeconomic History    Marital status:      Spouse name: Not on file    Number of children: Not on file    Years of education: Not on file    Highest education level: Not on file   Occupational History    Not on file   Social Needs    Financial resource strain: Not on file    Food insecurity     Worry: Not on file Inability: Not on file    Transportation needs     Medical: Not on file     Non-medical: Not on file   Tobacco Use    Smoking status: Never Smoker    Smokeless tobacco: Never Used   Substance and Sexual Activity    Alcohol use: Yes     Comment: very rare    Drug use: Never    Sexual activity: Not Currently   Lifestyle    Physical activity     Days per week: Not on file     Minutes per session: Not on file    Stress: Not on file   Relationships    Social connections     Talks on phone: Not on file     Gets together: Not on file     Attends [employer] service: Not on file     Active member of club or organization: Not on file     Attends meetings of clubs or organizations: Not on file     Relationship status: Not on file    Intimate partner violence     Fear of current or ex partner: Not on file     Emotionally abused: Not on file     Physically abused: Not on file     Forced sexual activity: Not on file   Other Topics Concern    Not on file   Social History Narrative    Not on file       Allergies   Allergen Reactions    Amoxicillin Itching     Burning and itching of the skin    Lipitor     Naproxen      Other reaction(s): Unknown    Robaxin [Methocarbamol]        Current Outpatient Medications   Medication Sig Dispense Refill    meclizine (ANTIVERT) 25 MG tablet Take 1 tablet by mouth 2 times daily 180 tablet 3    cyclobenzaprine (FLEXERIL) 5 MG tablet Take 5 mg by mouth nightly as needed for Muscle spasms      acetaminophen (APAP EXTRA STRENGTH) 500 MG tablet Take 2 tablets by mouth every 6 hours as needed for Pain 120 tablet 0    primidone (MYSOLINE) 250 MG tablet Take 1 tablet by mouth 2 times daily (Patient taking differently: Take 250 mg by mouth nightly ) 180 tablet 3    fluticasone (FLONASE) 50 MCG/ACT nasal spray 1 spray by Each Nostril route daily      albuterol (ACCUNEB) 1.25 MG/3ML nebulizer solution Inhale 1 ampule into the lungs every 6 hours as needed for Wheezing      budesonide-formoterol (SYMBICORT) 160-4.5 MCG/ACT AERO Inhale 2 puffs into the lungs 2 times daily      amitriptyline (ELAVIL) 10 MG tablet TAKE 1 TABLET DAILY AS NEEDED 90 tablet 1    PARoxetine (PAXIL) 30 MG tablet TAKE 1 TABLET DAILY      nitroGLYCERIN (NITROSTAT) 0.4 MG SL tablet Place 1 tablet under the tongue as needed (cp) 25 tablet 5    fexofenadine (ALLEGRA ALLERGY) 60 MG tablet Take 60 mg by mouth daily      loperamide (IMODIUM) 2 MG capsule Take 2 mg by mouth 4 times daily as needed for Diarrhea      atenolol (TENORMIN) 50 MG tablet Take 1 tablet by mouth daily. 90 tablet 3    DiphenhydrAMINE HCl (BENADRYL ALLERGY PO) Take  by mouth as needed.  aspirin 81 MG EC tablet Take 81 mg by mouth daily. No current facility-administered medications for this visit. Review of Systems:   Review of Systems   Constitutional: Negative. Negative for diaphoresis and fatigue. HENT: Negative. Eyes: Negative. Respiratory: Positive for shortness of breath. Negative for cough, chest tightness, wheezing and stridor. Cardiovascular: Negative. Negative for chest pain, palpitations and leg swelling. Gastrointestinal: Negative. Negative for blood in stool and nausea. Genitourinary: Negative. Musculoskeletal: Negative. Skin: Negative. Neurological: Positive for tremors and light-headedness. Negative for dizziness, syncope and weakness. Hematological: Negative. Psychiatric/Behavioral: Negative. Physical Examination:    There were no vitals taken for this visit.    Physical Exam    LABS:  CBC:   Lab Results   Component Value Date    WBC 6.4 12/17/2019    RBC 4.13 12/17/2019    HGB 13.9 12/17/2019    HCT 40.1 12/17/2019    MCV 97.0 12/17/2019    MCH 33.7 12/17/2019    MCHC 34.7 12/17/2019    RDW 13.1 12/17/2019     12/17/2019     Lipids:  Lab Results   Component Value Date    CHOL 175 09/17/2012     Lab Results   Component Value Date    TRIG 187 (H) 09/17/2012 Dorsalgia, unspecified    Esophageal disorder    Long term (current) use of aspirin    Personal history of other diseases of the female genital tract    Prsnl history of dis of the bld/bld-form org/immun mechnsm    Unspecified osteoarthritis, unspecified site    Acute recurrent maxillary sinusitis    Adverse food reaction    Allergic rhinitis due to animals    Allergic rhinitis due to dust mite    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, subsequent encounter    Asthma, moderate persistent, poorly-controlled    Bronchospasm    Cardiomyopathy ([address]    Constipation    Constitutional obesity    Degeneration of lumbar or lumbosacral intervertebral disc    Displacement of lumbar intervertebral disc without myelopathy    Enthesopathy of hip region    Lumbago    Lumbosacral spondylosis without myelopathy    Nonspecific abnormal finding in stool contents    Snoring    Vertigo    PSVT (paroxysmal supraventricular tachycardia) (HCC)    Left bundle branch block    Dizzy    Anxiety       There are no discontinued medications. Modified Medications    No medications on file       No orders of the defined types were placed in this encounter. Assessment/Plan:    1. PSVT (paroxysmal supraventricular tachycardia) (HCC)  stable    2. Left bundle branch block  Chronic     3. Pure hypercholesterolemia   statin     4. Shortness of breath   stable   F/u Echo -EF 60    5. Essential hypertension   stable on meds. 6. Dizzy- CUS. Counseling:  Heart Healthy Lifestyle, Improve BMI, Low Salt Diet, Take Precautions to Prevent Falls and Walk Daily    Return in about 3 months (around 11/20/2020).   Pursuant to the emergency declaration under the [facility] waiver authority and the [employer] Act, this Virtual Visit was conducted, with patient's consent, to reduce the patient's risk of exposure to COVID-19 and provide continuity of care for an established patient. Services were provided through a video synchronous discussion virtually to substitute for in-person clinic visit. Visit completed using [employer]. me. Patient was located at home and I was located in the clinic.     Electronically signed by Ajit Goodson MD on 8/20/2020 at 11:46 AM

## 2020-11-03 PROBLEM — R42 DIZZY: Status: RESOLVED | Noted: 2020-08-20 | Resolved: 2020-11-03

## 2021-01-22 ENCOUNTER — OFFICE VISIT (OUTPATIENT)
Dept: CARDIOLOGY CLINIC | Age: 61
End: 2021-01-22
Payer: COMMERCIAL

## 2021-01-22 VITALS
HEART RATE: 73 BPM | BODY MASS INDEX: 38.33 KG/M2 | SYSTOLIC BLOOD PRESSURE: 122 MMHG | RESPIRATION RATE: 16 BRPM | OXYGEN SATURATION: 98 % | HEIGHT: 61 IN | WEIGHT: 203 LBS | DIASTOLIC BLOOD PRESSURE: 78 MMHG

## 2021-01-22 DIAGNOSIS — I10 ESSENTIAL HYPERTENSION: Primary | ICD-10-CM

## 2021-01-22 DIAGNOSIS — I42.9 CARDIOMYOPATHY, UNSPECIFIED TYPE (HCC): ICD-10-CM

## 2021-01-22 DIAGNOSIS — E78.5 HYPERLIPIDEMIA, UNSPECIFIED HYPERLIPIDEMIA TYPE: ICD-10-CM

## 2021-01-22 DIAGNOSIS — I47.1 PSVT (PAROXYSMAL SUPRAVENTRICULAR TACHYCARDIA) (HCC): ICD-10-CM

## 2021-01-22 PROCEDURE — 3017F COLORECTAL CA SCREEN DOC REV: CPT | Performed by: INTERNAL MEDICINE

## 2021-01-22 PROCEDURE — G8417 CALC BMI ABV UP PARAM F/U: HCPCS | Performed by: INTERNAL MEDICINE

## 2021-01-22 PROCEDURE — G8484 FLU IMMUNIZE NO ADMIN: HCPCS | Performed by: INTERNAL MEDICINE

## 2021-01-22 PROCEDURE — G8427 DOCREV CUR MEDS BY ELIG CLIN: HCPCS | Performed by: INTERNAL MEDICINE

## 2021-01-22 PROCEDURE — 1036F TOBACCO NON-USER: CPT | Performed by: INTERNAL MEDICINE

## 2021-01-22 PROCEDURE — 99214 OFFICE O/P EST MOD 30 MIN: CPT | Performed by: INTERNAL MEDICINE

## 2021-01-22 RX ORDER — DULAGLUTIDE 0.75 MG/.5ML
INJECTION, SOLUTION SUBCUTANEOUS
COMMUNITY
Start: 2021-01-15

## 2021-01-22 RX ORDER — ROSUVASTATIN CALCIUM 5 MG/1
1 TABLET, COATED ORAL EVERY EVENING
COMMUNITY
Start: 2021-01-14

## 2021-01-22 RX ORDER — NITROGLYCERIN 0.4 MG/1
0.4 TABLET SUBLINGUAL PRN
Qty: 25 TABLET | Refills: 5 | Status: SHIPPED | OUTPATIENT
Start: 2021-01-22

## 2021-01-22 ASSESSMENT — ENCOUNTER SYMPTOMS
EYES NEGATIVE: 1
NAUSEA: 0
STRIDOR: 0
BLOOD IN STOOL: 0
CHEST TIGHTNESS: 0
WHEEZING: 0
SHORTNESS OF BREATH: 1
GASTROINTESTINAL NEGATIVE: 1
COUGH: 0

## 2021-01-22 NOTE — PROGRESS NOTES
Subsequent Progress Note  Patient: Hari See  YOB: 1960  MRN: [id number]    Chief Complaint:wagner htn psvt lipid   Chief Complaint   Patient presents with    Follow-up     5 month    Hypertension    Tachycardia     PSVT    Cardiomyopathy       CV Data:  2/2017 echo 55%  7/2016  Deep Brain Stimulator for tremors.  (device is located Left Pectoral)- followed by Linda Bojorquez  1/2019 spect negative  3/2020 Echo EF 60  3/2020 CUS negative    Subjective/HPI: some wagner no cp no falls no bleed takes meds walks     2/12/2020 had 1 episode fall and evaluated in ER. No cp no sob no bleed. 8/20/2020 TELEHEALTH EVALUATION -- Audio/Visual (During VDEIN-29 public health emergency)    Doing well still has tremors but stable. No sob. occ cp - ill defined. No bleed no falls. Takes meds walks    1/22/21 recent Dx wti DM. Hb 12.1.   No cp no sob no palp      nonsmoker  Retired     EKG: SR 76 LBBB    Past Medical History:   Diagnosis Date    Acute serous otitis media     Asthma     Atherosclerosis of native coronary artery of native heart without angina pectoris 9/26/2017    Cellulitis     of left toe    Chronic depression     CTS (carpal tunnel syndrome)     Diabetes mellitus ([facility] [address]     Essential hypertension 8/14/2019    Hyperlipidemia     Lumbar radiculopathy     PSVT (paroxysmal supraventricular tachycardia) ([facility])     Seasonal allergic rhinitis     Tremor     bilateral, nonspecific        Past Surgical History:   Procedure Laterality Date    BACK SURGERY      CARDIAC CATHETERIZATION  2008    Micro vascular angina       Family History   Problem Relation Age of Onset    Miscarriages / Stillbirths Mother     Early Death Father     Heart Disease Father     High Blood Pressure Father        Social History     Socioeconomic History    Marital status:      Spouse name: None    Number of children: None    Years of education: None    Highest education level: None Occupational History    None   Social Needs    Financial resource strain: None    Food insecurity     Worry: None     Inability: None    Transportation needs     Medical: None     Non-medical: None   Tobacco Use    Smoking status: Never Smoker    Smokeless tobacco: Never Used   Substance and Sexual Activity    Alcohol use: Yes     Comment: very rare    Drug use: Never    Sexual activity: Not Currently   Lifestyle    Physical activity     Days per week: None     Minutes per session: None    Stress: None   Relationships    Social connections     Talks on phone: None     Gets together: None     Attends Sikh service: None     Active member of club or organization: None     Attends meetings of clubs or organizations: None     Relationship status: None    Intimate partner violence     Fear of current or ex partner: None     Emotionally abused: None     Physically abused: None     Forced sexual activity: None   Other Topics Concern    None   Social History Narrative    None       Allergies   Allergen Reactions    Amoxicillin Itching     Burning and itching of the skin    Lipitor     Naproxen      Other reaction(s): Unknown    Robaxin [Methocarbamol]        Current Outpatient Medications   Medication Sig Dispense Refill    metFORMIN (GLUCOPHAGE) 500 MG tablet Take 500 mg by mouth daily (with breakfast)      rosuvastatin (CRESTOR) 5 MG tablet Take 1 tablet by mouth every evening      TRULICITY 7.04 XP/8.0ZJ SOPN inject 0.5 milliliters ( 0.75 milligrams ) subcutaneously every w...  (REFER TO PRESCRIPTION NOTES).       primidone (MYSOLINE) 250 MG tablet TAKE 1 TABLET TWICE A  tablet 3    meclizine (ANTIVERT) 25 MG tablet Take 1 tablet by mouth 2 times daily 180 tablet 3    cyclobenzaprine (FLEXERIL) 5 MG tablet Take 5 mg by mouth nightly as needed for Muscle spasms      acetaminophen (APAP EXTRA STRENGTH) 500 MG tablet Take 2 tablets by mouth every 6 hours as needed for Pain 120 tablet 0  albuterol (ACCUNEB) 1.25 MG/3ML nebulizer solution Inhale 1 ampule into the lungs every 6 hours as needed for Wheezing      budesonide-formoterol (SYMBICORT) 160-4.5 MCG/ACT AERO Inhale 2 puffs into the lungs 2 times daily      amitriptyline (ELAVIL) 10 MG tablet TAKE 1 TABLET DAILY AS NEEDED 90 tablet 1    PARoxetine (PAXIL) 30 MG tablet TAKE 1 TABLET DAILY      nitroGLYCERIN (NITROSTAT) 0.4 MG SL tablet Place 1 tablet under the tongue as needed (cp) 25 tablet 5    fexofenadine (ALLEGRA ALLERGY) 60 MG tablet Take 60 mg by mouth daily      atenolol (TENORMIN) 50 MG tablet Take 1 tablet by mouth daily. 90 tablet 3    DiphenhydrAMINE HCl (BENADRYL ALLERGY PO) Take  by mouth as needed.  aspirin 81 MG EC tablet Take 81 mg by mouth daily. No current facility-administered medications for this visit. Review of Systems:   Review of Systems   Constitutional: Negative. Negative for diaphoresis and fatigue. HENT: Negative. Eyes: Negative. Respiratory: Positive for shortness of breath. Negative for cough, chest tightness, wheezing and stridor. Cardiovascular: Negative. Negative for chest pain, palpitations and leg swelling. Gastrointestinal: Negative. Negative for blood in stool and nausea. Genitourinary: Negative. Musculoskeletal: Negative. Skin: Negative. Neurological: Positive for tremors and light-headedness. Negative for dizziness, syncope and weakness. Hematological: Negative. Psychiatric/Behavioral: Negative.           Physical Examination:    /78 (Site: Left Upper Arm, Position: Sitting, Cuff Size: Large Adult)   Pulse 73   Resp 16   Ht 5' 1\" (1.549 m)   Wt 203 lb (92.1 kg)   SpO2 98%   BMI 38.36 kg/m²    Physical Exam    LABS:  CBC:   Lab Results   Component Value Date    WBC 6.4 12/17/2019    RBC 4.13 12/17/2019    HGB 13.9 12/17/2019    HCT 40.1 12/17/2019    MCV 97.0 12/17/2019    MCH 33.7 12/17/2019    MCHC 34.7 12/17/2019    RDW 13.1 12/17/2019     12/17/2019     Lipids:  Lab Results   Component Value Date    CHOL 175 09/17/2012     Lab Results   Component Value Date    TRIG 187 (H) 09/17/2012     Lab Results   Component Value Date    HDL 56 09/17/2012     Lab Results   Component Value Date    LDLCALC 89 09/17/2012     No results found for: LABVLDL, VLDL  Lab Results   Component Value Date    CHOLHDLRATIO 3.1 09/17/2012     CMP:    Lab Results   Component Value Date     12/17/2019    K 4.2 12/17/2019     12/17/2019    CO2 25 12/17/2019    BUN 9 12/17/2019    CREATININE 0.53 12/17/2019    GFRAA >60.0 12/17/2019    LABGLOM >60.0 12/17/2019    GLUCOSE 140 12/17/2019    PROT 7.6 12/17/2019    LABALBU 4.2 12/17/2019    LABALBU 4.5 03/28/2012    CALCIUM 9.1 12/17/2019    BILITOT 0.3 12/17/2019    ALKPHOS 175 12/17/2019    AST 36 12/17/2019    ALT 29 12/17/2019     BMP:    Lab Results   Component Value Date     12/17/2019    K 4.2 12/17/2019     12/17/2019    CO2 25 12/17/2019    BUN 9 12/17/2019    LABALBU 4.2 12/17/2019    LABALBU 4.5 03/28/2012    CREATININE 0.53 12/17/2019    CALCIUM 9.1 12/17/2019    GFRAA >60.0 12/17/2019    LABGLOM >60.0 12/17/2019    GLUCOSE 140 12/17/2019     Magnesium:  No results found for: MG  TSH:No results found for: TSHFT4, TSH    Patient Active Problem List   Diagnosis    Cellulitis    Acute serous otitis media    Asthma    Chronic depression    Tremor    Seasonal allergic rhinitis due to pollen    Carpal tunnel syndrome    Thoracic or lumbosacral neuritis or radiculitis, unspecified    Hyperlipidemia    Paroxysmal supraventricular tachycardia (HCC)    LBBB (left bundle branch block)    Dyspnea    Obesity, Class III, BMI 40-49.9 (morbid obesity) (HCC)    Pure hypercholesterolemia    Shortness of breath    Essential hypertension    Essential tremor    Benign paroxysmal positional vertigo    Abnormal finding on EKG    Acquired absence of other specified parts of digestive by Ramon Arnold MD on 1/22/2021 at 1:33 PM

## 2021-02-17 PROBLEM — M54.12 CERVICAL RADICULOPATHY: Status: ACTIVE | Noted: 2021-02-17

## 2021-02-17 PROBLEM — S06.0X0A CONCUSSION WITH NO LOSS OF CONSCIOUSNESS, INITIAL ENCOUNTER: Status: ACTIVE | Noted: 2021-02-17

## 2021-04-02 ENCOUNTER — OFFICE VISIT (OUTPATIENT)
Dept: NEUROLOGY | Age: 61
End: 2021-04-02
Payer: COMMERCIAL

## 2021-04-02 VITALS
BODY MASS INDEX: 37.22 KG/M2 | SYSTOLIC BLOOD PRESSURE: 128 MMHG | WEIGHT: 197 LBS | DIASTOLIC BLOOD PRESSURE: 78 MMHG | HEART RATE: 77 BPM

## 2021-04-02 DIAGNOSIS — Z96.89 S/P DEEP BRAIN STIMULATOR PLACEMENT: ICD-10-CM

## 2021-04-02 DIAGNOSIS — H81.13 BENIGN PAROXYSMAL POSITIONAL VERTIGO DUE TO BILATERAL VESTIBULAR DISORDER: ICD-10-CM

## 2021-04-02 DIAGNOSIS — R42 DIZZINESS: ICD-10-CM

## 2021-04-02 DIAGNOSIS — G25.0 BENIGN ESSENTIAL TREMOR: Primary | ICD-10-CM

## 2021-04-02 PROCEDURE — 99214 OFFICE O/P EST MOD 30 MIN: CPT | Performed by: PSYCHIATRY & NEUROLOGY

## 2021-04-02 PROCEDURE — G8427 DOCREV CUR MEDS BY ELIG CLIN: HCPCS | Performed by: PSYCHIATRY & NEUROLOGY

## 2021-04-02 PROCEDURE — 3017F COLORECTAL CA SCREEN DOC REV: CPT | Performed by: PSYCHIATRY & NEUROLOGY

## 2021-04-02 PROCEDURE — G8417 CALC BMI ABV UP PARAM F/U: HCPCS | Performed by: PSYCHIATRY & NEUROLOGY

## 2021-04-02 PROCEDURE — 1036F TOBACCO NON-USER: CPT | Performed by: PSYCHIATRY & NEUROLOGY

## 2021-04-02 RX ORDER — ACETAMINOPHEN AND CODEINE PHOSPHATE 300; 30 MG/1; MG/1
TABLET ORAL
COMMUNITY
Start: 2021-01-19 | End: 2021-04-02

## 2021-04-02 RX ORDER — FLUCONAZOLE 150 MG/1
TABLET ORAL
COMMUNITY
Start: 2021-01-09

## 2021-04-02 RX ORDER — CLOTRIMAZOLE AND BETAMETHASONE DIPROPIONATE 10; .64 MG/G; MG/G
CREAM TOPICAL
COMMUNITY
Start: 2021-01-12 | End: 2021-09-27

## 2021-04-02 RX ORDER — NYSTATIN 100000 [USP'U]/G
POWDER TOPICAL
COMMUNITY
Start: 2021-01-14

## 2021-04-02 RX ORDER — ESCITALOPRAM OXALATE 20 MG/1
TABLET ORAL
COMMUNITY
Start: 2021-03-06

## 2021-04-02 RX ORDER — METFORMIN HYDROCHLORIDE 500 MG/1
TABLET, EXTENDED RELEASE ORAL
COMMUNITY
Start: 2021-03-17

## 2021-04-02 ASSESSMENT — ENCOUNTER SYMPTOMS
NAUSEA: 0
PHOTOPHOBIA: 0
BACK PAIN: 0
CHOKING: 0
SHORTNESS OF BREATH: 0
COLOR CHANGE: 0
VOMITING: 0
TROUBLE SWALLOWING: 0

## 2021-04-02 NOTE — PROGRESS NOTES
Subjective:      Patient ID: Abbi Arias is a 61 y.o. female who presents today for:  Chief Complaint   Patient presents with    Follow-up     Pt states that her tremor is still the same. She recently got diagnosed with diabetes. She is still having the dizziness, but the meclizine does help it. HPI 51-year-old right-handed female with history of essential tremors. Patient has unilateral DBS when last seen we are tapering her off her Mysoline. She also has dizziness and we have treated her with Antivert. When last seen I recommended that she see Dr. Inge Archuleta see if she requires bilateral DBS. When her DBS was done she was unilateral.  Patient was on Mysoline 250 mg twice a day she did not tolerate that she is only taking 250 mg at night she is not quite sure if she is making any difference.   The dizziness appears to be improving she takes twice a day Antivert on a chronic basis.  /Seen patient was diagnosed with diabetes she lost about 30 pounds in weight and she is doing somewhat better she also has now a nonalcoholic fatty liver    Past Medical History:   Diagnosis Date    Acute serous otitis media     Asthma     Atherosclerosis of native coronary artery of native heart without angina pectoris 9/26/2017    Cellulitis     of left toe    Chronic depression     CTS (carpal tunnel syndrome)     Diabetes mellitus ([facility] [address]     Essential hypertension 8/14/2019    Hyperlipidemia     Lumbar radiculopathy     PSVT (paroxysmal supraventricular tachycardia) ([facility]     Seasonal allergic rhinitis     Tremor     bilateral, nonspecific      Past Surgical History:   Procedure Laterality Date    BACK SURGERY      CARDIAC CATHETERIZATION  2008    Micro vascular angina     Social History     Socioeconomic History    Marital status:      Spouse name: Not on file    Number of children: Not on file    Years of education: Not on file    Highest education level: Not on file   Occupational History    allergies. Neurological: Positive for tremors. Negative for dizziness, seizures, syncope, facial asymmetry, speech difficulty, weakness, light-headedness, numbness and headaches. Psychiatric/Behavioral: Negative for behavioral problems, confusion, hallucinations and sleep disturbance. Objective:   /78 (Site: Left Upper Arm, Position: Sitting, Cuff Size: Large Adult)   Pulse 77   Wt 197 lb (89.4 kg)   BMI 37.22 kg/m²     Physical Exam  Vitals signs reviewed. Eyes:      Pupils: Pupils are equal, round, and reactive to light. Neck:      Musculoskeletal: Normal range of motion. Cardiovascular:      Rate and Rhythm: Normal rate and regular rhythm. Heart sounds: No murmur. Pulmonary:      Effort: Pulmonary effort is normal.      Breath sounds: Normal breath sounds. Abdominal:      General: Bowel sounds are normal.   Musculoskeletal: Normal range of motion. Skin:     General: Skin is warm. Neurological:      Mental Status: She is alert and oriented to person, place, and time. Cranial Nerves: No cranial nerve deficit. Sensory: No sensory deficit. Motor: No abnormal muscle tone. Coordination: Coordination normal.      Deep Tendon Reflexes: Reflexes are normal and symmetric. Babinski sign absent on the right side. Babinski sign absent on the left side.    Psychiatric:         Mood and Affect: Mood normal.       Tremor only notable on outstretched hand on the left  Echo Complete 2d W Doppler W Color    Result Date: 3/6/2020  Transthoracic Echocardiography Report (TTE)  Demographics  Patient Name   Saroj Mroales  Gender               Female                 L  Patient Number [id number]         Race                                                   Ethnicity  Visit Number   [id number]        Room Number  Corporate ID                    Date of Study        03/06/2020  Accession      [id number]        Referring Physician  Number  Date of Birth  1960 Sonographer  Age            61 year(s)       [facility]                                  Physician            Cardiology                                                       Holiday Lucas NEELY DO Procedure Type of Study  TTE procedure:ECHO COMPLETE 2D W/DOP W/COLOR. Procedure Date Date: 03/06/2020 Start: 01:56 PM Study Location: Echo Lab Technical Quality: Adequate visualization Indications:LVF. Patient Status: Routine Height: 61 inches Weight: 234 pounds BSA: 2.02 m^2 BMI: 44.21 kg/m^2 BP: 114/72 mmHg  Conclusions  Summary  Normal left ventricular systolic function, no regional wall motion  abnormalities, estimated ejection fraction of 60%. Normal left ventricular size and function. Moderate concentric left ventricular hypertrophy. Impaired relaxation compatible with diastolic dysfunction. ( reversed E/A  ratio)  No evidence of mitral regurgitation. No evidence of mitral valve stenosis. No evidence of aortic valve regurgitation . No evidence of aortic valve stenosis. There is Trace tricuspid regurgitation with estimated RVSP of 37 mm Hg. Signature  ----------------------------------------------------------------  Electronically signed by Kavon Gray DO(Interpreting  physician) on 03/06/2020 03:37 PM  ----------------------------------------------------------------  Findings Left Ventricle Normal left ventricular systolic function, no regional wall motion abnormalities, estimated ejection fraction of 60%. Normal left ventricular size and function. Moderate concentric left ventricular hypertrophy. Impaired relaxation compatible with diastolic dysfunction. ( reversed E/A ratio) Right Ventricle Normal right ventricle structure and function. Normal right ventricle systolic pressure. Left Atrium Normal left atrium. Right Atrium Normal right atrium. Mitral Valve Diffusely thickened and pliable mitral valve leaflets with normal excursion. No evidence of mitral regurgitation.  No evidence of ATHEROSCLEROSIS OF THE CAROTID TREES ON BOTH SIDES. MINIMAL INTIMAL THICKENING. BILATERAL ICA LESS THAN 50% STENOSIS. BILATERAL ANTEGRADE VERTEBRAL FLOW. Lab Results   Component Value Date    WBC 6.4 12/17/2019    RBC 4.13 12/17/2019    HGB 13.9 12/17/2019    HCT 40.1 12/17/2019    MCV 97.0 12/17/2019    MCH 33.7 12/17/2019    MCHC 34.7 12/17/2019    RDW 13.1 12/17/2019     12/17/2019     Lab Results   Component Value Date     12/17/2019    K 4.2 12/17/2019     12/17/2019    CO2 25 12/17/2019    BUN 9 12/17/2019    CREATININE 0.53 12/17/2019    GFRAA >60.0 12/17/2019    LABGLOM >60.0 12/17/2019    GLUCOSE 140 12/17/2019    PROT 7.6 12/17/2019    LABALBU 4.2 12/17/2019    LABALBU 4.5 03/28/2012    CALCIUM 9.1 12/17/2019    BILITOT 0.3 12/17/2019    ALKPHOS 175 12/17/2019    AST 36 12/17/2019    ALT 29 12/17/2019     Lab Results   Component Value Date    PROTIME 13.2 12/17/2019    INR 1.0 12/17/2019     No results found for: TSH, [id number], FOLATE, FERRITIN, IRON, TIBC, PTRFSAT, TSH, FREET4  Lab Results   Component Value Date    TRIG 187 09/17/2012    HDL 56 09/17/2012    LDLCALC 89 09/17/2012     No results found for: Nelta Sinner, LABBENZ, CANNAB, COCAINESCRN, LABMETH, OPIATESCREENURINE, PHENCYCLIDINESCREENURINE, PPXUR, ETOH  No results found for: LITHIUM, DILFRTOT, VALPROATE    Assessment:       Diagnosis Orders   1. Benign essential tremor     2. Dizziness     3. Benign paroxysmal positional vertigo due to bilateral vestibular disorder     4. S/P deep brain stimulator placement     Benign essential tremor with no notable response to Mysoline. We had decreased her Mysoline to 250 mg as the twice a day dosing was causing sedation. She did not notice much of a difference. We are further referred again back to Dr. Thea Bobo as she only has 1D DBS and at recommended to leave DBS bilateral as now she is having more symptoms on the left side.   She has [place] no symptoms on the right.  She is not developed any parkinsonian features. History of patient's chronic dizziness with benign patient vertigo. We have kept her on continuous use of Antivert which is helping she is takes it twice a day and she has not developed any hearing loss or tinnitus. We will keep an eye on this. Since last session she was diagnosed diabetes and has lost 3 pounds in weight doing somewhat better. She is now also has a nonalcoholic fatty liver. She is followed for the same. In the event that this is a concern we may discontinue the Mysoline. Plan:      No orders of the defined types were placed in this encounter. No orders of the defined types were placed in this encounter. No follow-ups on file.       Lori Geronimo MD

## 2021-04-05 RX ORDER — MECLIZINE HYDROCHLORIDE 25 MG/1
25 TABLET ORAL 2 TIMES DAILY
Qty: 180 TABLET | Refills: 3 | Status: SHIPPED | OUTPATIENT
Start: 2021-04-05 | End: 2021-09-27

## 2021-09-27 ENCOUNTER — OFFICE VISIT (OUTPATIENT)
Dept: CARDIOLOGY CLINIC | Age: 61
End: 2021-09-27
Payer: COMMERCIAL

## 2021-09-27 VITALS
HEIGHT: 61 IN | HEART RATE: 79 BPM | SYSTOLIC BLOOD PRESSURE: 122 MMHG | BODY MASS INDEX: 37.38 KG/M2 | WEIGHT: 198 LBS | RESPIRATION RATE: 16 BRPM | DIASTOLIC BLOOD PRESSURE: 68 MMHG | OXYGEN SATURATION: 97 %

## 2021-09-27 DIAGNOSIS — I42.9 CARDIOMYOPATHY, UNSPECIFIED TYPE (HCC): ICD-10-CM

## 2021-09-27 DIAGNOSIS — E78.5 HYPERLIPIDEMIA, UNSPECIFIED HYPERLIPIDEMIA TYPE: ICD-10-CM

## 2021-09-27 DIAGNOSIS — I10 ESSENTIAL HYPERTENSION: Primary | ICD-10-CM

## 2021-09-27 DIAGNOSIS — R09.89 BILATERAL CAROTID BRUITS: ICD-10-CM

## 2021-09-27 DIAGNOSIS — I47.1 PSVT (PAROXYSMAL SUPRAVENTRICULAR TACHYCARDIA) (HCC): ICD-10-CM

## 2021-09-27 PROCEDURE — 99214 OFFICE O/P EST MOD 30 MIN: CPT | Performed by: INTERNAL MEDICINE

## 2021-09-27 PROCEDURE — 1036F TOBACCO NON-USER: CPT | Performed by: INTERNAL MEDICINE

## 2021-09-27 PROCEDURE — G8427 DOCREV CUR MEDS BY ELIG CLIN: HCPCS | Performed by: INTERNAL MEDICINE

## 2021-09-27 PROCEDURE — 93000 ELECTROCARDIOGRAM COMPLETE: CPT | Performed by: INTERNAL MEDICINE

## 2021-09-27 PROCEDURE — 3017F COLORECTAL CA SCREEN DOC REV: CPT | Performed by: INTERNAL MEDICINE

## 2021-09-27 PROCEDURE — G8417 CALC BMI ABV UP PARAM F/U: HCPCS | Performed by: INTERNAL MEDICINE

## 2021-09-27 ASSESSMENT — ENCOUNTER SYMPTOMS
CHEST TIGHTNESS: 0
EYES NEGATIVE: 1
COUGH: 0
NAUSEA: 0
BLOOD IN STOOL: 0
STRIDOR: 0
WHEEZING: 0
GASTROINTESTINAL NEGATIVE: 1

## 2021-09-27 NOTE — PROGRESS NOTES
Subsequent Progress Note  Patient: Susi Barrett  YOB: 1960  MRN: [id number]    Chief Complaint:wagner htn psvt lipid   Chief Complaint   Patient presents with    Follow-up     8 month    Hypertension    Cardiomyopathy    Tachycardia     PSVT       CV Data:  2/2017 echo 55%  7/2016  Deep Brain Stimulator for tremors.  (device is located Left Pectoral)- followed by Herman Ansari  1/2019 spect negative  3/2020 Echo EF 60  3/2020 CUS negative    Subjective/HPI: some wagner no cp no falls no bleed takes meds walks     2/12/2020 had 1 episode fall and evaluated in ER. No cp no sob no bleed. 8/20/2020 TELEHEALTH EVALUATION -- [facility]    Doing well still has tremors but stable. No sob. occ cp - ill defined. No bleed no falls. Takes meds walks    1/22/21 recent Dx wti DM. Hb 12.1. No cp no sob no palp     9/27/21 no cp no sob no falls no bleed takes meds. Eating well.  Lost 30LBS intentionally      nonsmoker  Retired     EKG: SR 66 LBBB    Past Medical History:   Diagnosis Date    Acute serous otitis media     Asthma     Atherosclerosis of native coronary artery of native heart without angina pectoris 9/26/2017    Cellulitis     of left toe    Chronic depression     CTS (carpal tunnel syndrome)     Diabetes mellitus ([address]     Essential hypertension 8/14/2019    Hyperlipidemia     Lumbar radiculopathy     PS[facility]ox[facility]     Seasonal allergic rhinitis     Tremor     bilateral, nonspecific        Past Surgical History:   Procedure Laterality Date    BACK SURGERY      CARDIAC CATHETERIZATION  2008    Micro vascular angina       Family History   Problem Relation Age of Onset    Miscarriages / Stillbirths Mother     Early Death Father     Heart Disease Father     High Blood Pressure Father        Social History     Socioeconomic History    Marital status:      Spouse name: None    Number of children: None    Years of education: None    Highest education level: None   Occupational History    None   Tobacco Use    Smoking status: Never Smoker    Smokeless tobacco: Never Used   Vaping Use    Vaping Use: Never used   Substance and Sexual Activity    Alcohol use: Yes     Comment: very rare    Drug use: Never    Sexual activity: Not Currently   Other Topics Concern    None   Social History Narrative    None     Social Determinants of Health     Financial Resource Strain:     Difficulty of Paying Living Expenses:    Food Insecurity:     Worried About Running Out of Food in the Last Year:     Ran Out of Food in the Last Year:    Transportation Needs:     Lack of Transportation (Medical):  Lack of Transportation (Non-Medical):    Physical Activity:     Days of Exercise per Week:     Minutes of Exercise per Session:    Stress:     Feeling of Stress :    Social Connections:     Frequency of Communication with Friends and Family:     Frequency of Social Gatherings with Friends and Family:     Attends Jew Services:     Active Member of Clubs or Organizations:     Attends Club or Organization Meetings:     Marital Status:    Intimate Partner Violence:     Fear of Current or Ex-Partner:     Emotionally Abused:     Physically Abused:     Sexually Abused: Allergies   Allergen Reactions    Amoxicillin Itching     Burning and itching of the skin    Lipitor     Naproxen      Other reaction(s): Unknown    Robaxin [Methocarbamol]        Current Outpatient Medications   Medication Sig Dispense Refill    primidone (MYSOLINE) 250 MG tablet TAKE 1 TABLET TWICE A  tablet 3    meclizine (ANTIVERT) 25 MG tablet Take 1 tablet by mouth 2 times daily 180 tablet 3    escitalopram (LEXAPRO) 20 MG tablet take 1 tablet by mouth once daily      fluconazole (DIFLUCAN) 150 MG tablet take 1 tablet by mouth on day 1 then take 1 tablet by mouth on da. ..  (REFER TO dizziness, syncope and weakness. Hematological: Negative. Psychiatric/Behavioral: Negative. Physical Examination:    /68 (Site: Right Upper Arm, Position: Sitting, Cuff Size: Large Adult)   Pulse 79   Resp 16   Ht 5' 1\" (1.549 m)   Wt 198 lb (89.8 kg)   SpO2 97%   BMI 37.41 kg/m²    Physical Exam   Constitutional: She appears healthy. No distress. HENT:   Normal cephalic and Atraumatic   Eyes: Pupils are equal, round, and reactive to light. Neck: Thyroid normal. No JVD present. No neck adenopathy. No thyromegaly present. Cardiovascular: Normal rate, regular rhythm, normal heart sounds, intact distal pulses and normal pulses. Pulmonary/Chest: Effort normal and breath sounds normal. She has no wheezes. She has no rales. She exhibits no tenderness. Abdominal: Soft. Bowel sounds are normal. There is no abdominal tenderness. Musculoskeletal:         General: No tenderness or edema. Normal range of motion. Cervical back: Normal range of motion and neck supple. Neurological: She is alert and oriented to person, place, and time. Skin: Skin is warm. No cyanosis. Nails show no clubbing.        LABS:  CBC:   Lab Results   Component Value Date    WBC 6.4 12/17/2019    RBC 4.13 12/17/2019    HGB 13.9 12/17/2019    HCT 40.1 12/17/2019    MCV 97.0 12/17/2019    MCH 33.7 12/17/2019    MCHC 34.7 12/17/2019    RDW 13.1 12/17/2019     12/17/2019     Lipids:  Lab Results   Component Value Date    CHOL 175 09/17/2012     Lab Results   Component Value Date    TRIG 187 (H) 09/17/2012     Lab Results   Component Value Date    HDL 56 09/17/2012     Lab Results   Component Value Date    LDLCALC 89 09/17/2012     No results found for: LABVLDL, VLDL  Lab Results   Component Value Date    CHOLHDLRATIO 3.1 09/17/2012     CMP:    Lab Results   Component Value Date     12/17/2019    K 4.2 12/17/2019     12/17/2019    CO2 25 12/17/2019    BUN 9 12/17/2019    CREATININE 0.53 12/17/2019 GFRAA >60.0 12/17/2019    LABGLOM >60.0 12/17/2019    GLUCOSE 140 12/17/2019    PROT 7.6 12/17/2019    LABALBU 4.2 12/17/2019    LABALBU 4.5 03/28/2012    CALCIUM 9.1 12/17/2019    BILITOT 0.3 12/17/2019    ALKPHOS 175 12/17/2019    AST 36 12/17/2019    ALT 29 12/17/2019     BMP:    Lab Results   Component Value Date     12/17/2019    K 4.2 12/17/2019     12/17/2019    CO2 25 12/17/2019    BUN 9 12/17/2019    LABALBU 4.2 12/17/2019    LABALBU 4.5 03/28/2012    CREATININE 0.53 12/17/2019    CALCIUM 9.1 12/17/2019    GFRAA >60.0 12/17/2019    LABGLOM >60.0 12/17/2019    GLUCOSE 140 12/17/2019     Magnesium:  No results found for: MG  TSH:No results found for: TSHFT4, TSH    Patient Active Problem List   Diagnosis    Cellulitis    Acute serous otitis media    Asthma    Chronic depression    Tremors of nervous system    Seasonal allergic rhinitis due to pollen    Carpal tunnel syndrome    Thoracic or lumbosacral neuritis or radiculitis, unspecified    Hyperlipidemia    Paroxysmal supraventricular tachycardia (HCC)    LBBB (left bundle branch block)    Dyspnea    Obesity, Class III, BMI 40-49.9 (morbid obesity) ([facility] [address]    Pure hypercholesterolemia    Shortness of breath    Essential hypertension    Benign essential tremor    Benign paroxysmal positional vertigo    Abnormal finding on EKG    Acquired absence of other specified parts of digestive tract    Arthrodesis status    Atherosclerosis of native coronary artery of native heart without angina pectoris    Body mass index (bmi) 39.0-39.9, adult    Dorsalgia, unspecified    Esophageal disorder    Long term (current) use of aspirin    Personal history of other diseases of the female genital tract    Prsnl history of dis of the bld/bld-form org/immun mechnsm    Unspecified osteoarthritis, unspecified site    Acute recurrent maxillary sinusitis    Adverse food reaction    Allergic rhinitis due to animals    Allergic rhinitis

## 2021-10-26 PROBLEM — Z98.890 PONV (POSTOPERATIVE NAUSEA AND VOMITING): Status: ACTIVE | Noted: 2021-06-24

## 2021-10-26 PROBLEM — E66.01 MORBID (SEVERE) OBESITY DUE TO EXCESS CALORIES (HCC): Status: ACTIVE | Noted: 2017-09-26

## 2021-10-26 PROBLEM — T88.4XXA DIFFICULT INTUBATION: Status: ACTIVE | Noted: 2021-06-24

## 2021-10-26 PROBLEM — F39 MOOD DISORDER (HCC): Status: ACTIVE | Noted: 2017-09-26

## 2021-10-26 PROBLEM — R11.2 PONV (POSTOPERATIVE NAUSEA AND VOMITING): Status: ACTIVE | Noted: 2021-06-24

## 2021-10-26 PROBLEM — E11.9 TYPE 2 DIABETES MELLITUS WITHOUT COMPLICATION, WITHOUT LONG-TERM CURRENT USE OF INSULIN (HCC): Status: ACTIVE | Noted: 2021-06-24

## 2021-10-26 PROBLEM — H25.813 COMBINED FORMS OF AGE-RELATED CATARACT OF BOTH EYES: Status: ACTIVE | Noted: 2021-07-28

## 2022-03-29 DIAGNOSIS — I47.1 PSVT (PAROXYSMAL SUPRAVENTRICULAR TACHYCARDIA) (HCC): ICD-10-CM

## 2022-03-29 NOTE — TELEPHONE ENCOUNTER
Requesting medication refill.  Please approve or deny this request.    Rx requested:  Requested Prescriptions     Pending Prescriptions Disp Refills    atenolol (TENORMIN) 50 MG tablet 90 tablet 3     Sig: Take 1 tablet by mouth daily         Last Office Visit:   9/27/2021      Next Visit Date:  Future Appointments   Date Time Provider Shay Thomas   4/26/2022  3:15 PM Randi Shipley MD [facility]   4/27/2022  3:30 PM Susanna Waggoner MD [facility]

## 2022-04-05 RX ORDER — ATENOLOL 50 MG/1
50 TABLET ORAL DAILY
Qty: 90 TABLET | Refills: 3 | Status: SHIPPED | OUTPATIENT
Start: 2022-04-05

## 2022-10-20 RX ORDER — PRIMIDONE 250 MG/1
TABLET ORAL
Qty: 360 TABLET | OUTPATIENT
Start: 2022-10-20

## 2023-03-21 DIAGNOSIS — I47.1 PSVT (PAROXYSMAL SUPRAVENTRICULAR TACHYCARDIA) (HCC): ICD-10-CM

## 2023-03-21 RX ORDER — ATENOLOL 50 MG/1
TABLET ORAL
Qty: 90 TABLET | Refills: 3 | Status: SHIPPED | OUTPATIENT
Start: 2023-03-21

## 2023-03-21 NOTE — TELEPHONE ENCOUNTER
Requesting medication refill. Please approve or deny this request.    Rx requested:  Requested Prescriptions     Pending Prescriptions Disp Refills    atenolol (TENORMIN) 50 MG tablet [Pharmacy Med Name: ATENOLOL 50 MG TABLET] 90 tablet 3     Sig: take 1 tablet by mouth once daily         Last Office Visit:   9/27/2021      Next Visit Date:  No future appointments. Last refill 03/29/2022. Please approve or deny.

## 2023-03-27 ENCOUNTER — TELEPHONE (OUTPATIENT)
Dept: NEUROLOGY | Age: 63
End: 2023-03-27

## 2023-03-27 NOTE — TELEPHONE ENCOUNTER
Patient has not been seen since April of 2021, She has canceled her last 3 appointments due to not being able to make it, She has a DBS and was being seen by [facility] and they were doing adjustments but now her insurance is not accepting [facility] and they want to know if we can have a [employer] rep come in and adjust .    Please advise       Call debora at [phone] ()

## 2023-11-30 PROBLEM — R42 VERTIGO: Status: ACTIVE | Noted: 2023-11-30

## 2023-11-30 PROBLEM — R41.843 PSYCHOMOTOR DEFICIT: Status: ACTIVE | Noted: 2023-11-30

## 2023-11-30 PROBLEM — Z04.9 CONDITION NOT FOUND: Status: ACTIVE | Noted: 2023-11-30

## 2023-11-30 PROBLEM — F32.A DEPRESSIVE DISORDER: Status: ACTIVE | Noted: 2023-11-30

## 2023-11-30 PROBLEM — S01.502A: Status: ACTIVE | Noted: 2023-11-30

## 2023-11-30 PROBLEM — F41.9 ANXIETY: Status: ACTIVE | Noted: 2023-11-30

## 2023-11-30 PROBLEM — F54 PSYCHOLOGICAL FACTOR AFFECTING PHYSICAL CONDITION: Status: ACTIVE | Noted: 2023-11-30

## 2023-11-30 PROBLEM — G25.0 ESSENTIAL TREMOR: Status: ACTIVE | Noted: 2023-11-30

## 2023-11-30 PROBLEM — R20.0 RIGHT UPPER EXTREMITY NUMBNESS: Status: ACTIVE | Noted: 2023-11-30

## 2023-11-30 PROBLEM — G31.84 MILD COGNITIVE IMPAIRMENT: Status: ACTIVE | Noted: 2023-11-30

## 2023-11-30 PROBLEM — H81.10 BENIGN PAROXYSMAL POSITIONAL VERTIGO: Status: ACTIVE | Noted: 2023-11-30

## 2023-11-30 RX ORDER — PRIMIDONE 250 MG/1
1 TABLET ORAL NIGHTLY
COMMUNITY
Start: 2016-09-06 | End: 2023-12-14 | Stop reason: SDUPTHER

## 2023-11-30 RX ORDER — NITROGLYCERIN 0.3 MG/1
TABLET SUBLINGUAL
COMMUNITY
Start: 2017-05-17

## 2023-11-30 RX ORDER — ASPIRIN 81 MG/1
1 TABLET ORAL DAILY
COMMUNITY
Start: 2017-05-17

## 2023-11-30 RX ORDER — TRIAMCINOLONE ACETONIDE 0.25 MG/G
CREAM TOPICAL
COMMUNITY
Start: 2017-05-10 | End: 2023-12-14 | Stop reason: WASHOUT

## 2023-11-30 RX ORDER — PRIMIDONE 50 MG/1
1 TABLET ORAL DAILY
COMMUNITY
Start: 2016-09-06 | End: 2023-12-13 | Stop reason: WASHOUT

## 2023-11-30 RX ORDER — DIPHENHYDRAMINE HCL 25 MG
TABLET ORAL
COMMUNITY

## 2023-11-30 RX ORDER — AMITRIPTYLINE HYDROCHLORIDE 10 MG/1
1 TABLET, FILM COATED ORAL NIGHTLY
COMMUNITY
Start: 2014-12-18 | End: 2023-12-14 | Stop reason: SDUPTHER

## 2023-11-30 RX ORDER — ATENOLOL 50 MG/1
1 TABLET ORAL DAILY
COMMUNITY
Start: 2014-08-12

## 2023-11-30 RX ORDER — CYCLOBENZAPRINE HCL 10 MG
1 TABLET ORAL DAILY
COMMUNITY
Start: 2014-09-26 | End: 2023-12-14 | Stop reason: WASHOUT

## 2023-11-30 RX ORDER — PAROXETINE 30 MG/1
1 TABLET, FILM COATED ORAL DAILY
COMMUNITY
Start: 2014-12-29 | End: 2023-12-14 | Stop reason: WASHOUT

## 2023-12-14 ENCOUNTER — OFFICE VISIT (OUTPATIENT)
Dept: NEUROLOGY | Facility: CLINIC | Age: 63
End: 2023-12-14
Payer: COMMERCIAL

## 2023-12-14 VITALS
HEART RATE: 76 BPM | SYSTOLIC BLOOD PRESSURE: 131 MMHG | BODY MASS INDEX: 40.89 KG/M2 | HEIGHT: 61 IN | WEIGHT: 216.6 LBS | DIASTOLIC BLOOD PRESSURE: 84 MMHG

## 2023-12-14 DIAGNOSIS — R42 VERTIGO: ICD-10-CM

## 2023-12-14 DIAGNOSIS — G25.0 ESSENTIAL TREMOR: Primary | ICD-10-CM

## 2023-12-14 DIAGNOSIS — G43.719 INTRACTABLE CHRONIC MIGRAINE WITHOUT AURA AND WITHOUT STATUS MIGRAINOSUS: ICD-10-CM

## 2023-12-14 DIAGNOSIS — Z96.89 S/P DEEP BRAIN STIMULATOR PLACEMENT: ICD-10-CM

## 2023-12-14 PROBLEM — Z98.890 PONV (POSTOPERATIVE NAUSEA AND VOMITING): Status: ACTIVE | Noted: 2021-06-24

## 2023-12-14 PROBLEM — E66.89 CONSTITUTIONAL OBESITY: Status: ACTIVE | Noted: 2019-08-09

## 2023-12-14 PROBLEM — T78.1XXA ADVERSE FOOD REACTION: Status: ACTIVE | Noted: 2019-08-09

## 2023-12-14 PROBLEM — I42.9 CARDIOMYOPATHY (MULTI): Status: ACTIVE | Noted: 2018-01-10

## 2023-12-14 PROBLEM — E78.00 PURE HYPERCHOLESTEROLEMIA: Status: ACTIVE | Noted: 2019-08-14

## 2023-12-14 PROBLEM — L03.90 CELLULITIS: Status: RESOLVED | Noted: 2023-12-14 | Resolved: 2023-12-14

## 2023-12-14 PROBLEM — S06.0X0A CONCUSSION WITH NO LOSS OF CONSCIOUSNESS, INITIAL ENCOUNTER: Status: ACTIVE | Noted: 2021-02-17

## 2023-12-14 PROBLEM — R06.83 SNORING: Status: ACTIVE | Noted: 2019-08-09

## 2023-12-14 PROBLEM — E66.01 MORBID (SEVERE) OBESITY DUE TO EXCESS CALORIES (MULTI): Status: ACTIVE | Noted: 2017-09-26

## 2023-12-14 PROBLEM — T88.4XXA DIFFICULT INTUBATION: Status: ACTIVE | Noted: 2021-06-24

## 2023-12-14 PROBLEM — J30.89 ALLERGIC RHINITIS DUE TO DUST MITE: Status: ACTIVE | Noted: 2019-08-09

## 2023-12-14 PROBLEM — M19.90 UNSPECIFIED OSTEOARTHRITIS, UNSPECIFIED SITE: Status: ACTIVE | Noted: 2017-09-26

## 2023-12-14 PROBLEM — G56.00 CARPAL TUNNEL SYNDROME: Status: ACTIVE | Noted: 2017-09-26

## 2023-12-14 PROBLEM — R94.31 ABNORMAL FINDING ON EKG: Status: ACTIVE | Noted: 2017-09-26

## 2023-12-14 PROBLEM — I25.10 ATHEROSCLEROSIS OF NATIVE CORONARY ARTERY OF NATIVE HEART WITHOUT ANGINA PECTORIS: Status: ACTIVE | Noted: 2017-09-26

## 2023-12-14 PROBLEM — H25.813 COMBINED FORMS OF AGE-RELATED CATARACT OF BOTH EYES: Status: ACTIVE | Noted: 2021-07-28

## 2023-12-14 PROBLEM — T78.00XD: Status: ACTIVE | Noted: 2019-08-09

## 2023-12-14 PROBLEM — R06.00 DYSPNEA: Status: ACTIVE | Noted: 2018-01-03

## 2023-12-14 PROBLEM — E11.9 TYPE 2 DIABETES MELLITUS WITHOUT COMPLICATION, WITHOUT LONG-TERM CURRENT USE OF INSULIN (MULTI): Status: ACTIVE | Noted: 2021-06-24

## 2023-12-14 PROBLEM — M54.12 CERVICAL RADICULOPATHY: Status: ACTIVE | Noted: 2021-02-17

## 2023-12-14 PROBLEM — K59.00 CONSTIPATION: Status: ACTIVE | Noted: 2019-01-29

## 2023-12-14 PROBLEM — Z90.49 ACQUIRED ABSENCE OF OTHER SPECIFIED PARTS OF DIGESTIVE TRACT: Status: ACTIVE | Noted: 2017-09-26

## 2023-12-14 PROBLEM — H65.00 ACUTE SEROUS OTITIS MEDIA: Status: ACTIVE | Noted: 2023-12-14

## 2023-12-14 PROBLEM — J45.40 ASTHMA, MODERATE PERSISTENT, POORLY-CONTROLLED (HHS-HCC): Status: ACTIVE | Noted: 2019-08-09

## 2023-12-14 PROBLEM — F32.A CHRONIC DEPRESSION: Status: ACTIVE | Noted: 2017-09-26

## 2023-12-14 PROBLEM — Z98.1 ARTHRODESIS STATUS: Status: ACTIVE | Noted: 2017-09-26

## 2023-12-14 PROBLEM — M54.9 DORSALGIA, UNSPECIFIED: Status: ACTIVE | Noted: 2017-09-26

## 2023-12-14 PROBLEM — E78.5 HYPERLIPIDEMIA: Status: ACTIVE | Noted: 2023-12-14

## 2023-12-14 PROBLEM — K22.9 ESOPHAGEAL DISORDER: Status: ACTIVE | Noted: 2017-09-26

## 2023-12-14 PROBLEM — R11.2 PONV (POSTOPERATIVE NAUSEA AND VOMITING): Status: ACTIVE | Noted: 2021-06-24

## 2023-12-14 PROBLEM — R25.1 TREMOR: Status: RESOLVED | Noted: 2023-12-14 | Resolved: 2023-12-14

## 2023-12-14 PROBLEM — R06.02 SHORTNESS OF BREATH: Status: ACTIVE | Noted: 2019-08-14

## 2023-12-14 PROBLEM — I10 ESSENTIAL HYPERTENSION: Status: ACTIVE | Noted: 2019-08-14

## 2023-12-14 PROBLEM — Z86.2: Status: ACTIVE | Noted: 2017-09-26

## 2023-12-14 PROBLEM — R25.1 TREMOR: Status: ACTIVE | Noted: 2023-12-14

## 2023-12-14 PROBLEM — R19.5 NONSPECIFIC ABNORMAL FINDING IN STOOL CONTENTS: Status: ACTIVE | Noted: 2019-01-29

## 2023-12-14 PROBLEM — I44.7 LEFT BUNDLE BRANCH BLOCK (LBBB): Status: ACTIVE | Noted: 2017-09-26

## 2023-12-14 PROBLEM — F39 MOOD DISORDER (CMS-HCC): Status: ACTIVE | Noted: 2017-09-26

## 2023-12-14 PROBLEM — L03.90 CELLULITIS: Status: ACTIVE | Noted: 2023-12-14

## 2023-12-14 PROBLEM — I47.10 PAROXYSMAL SUPRAVENTRICULAR TACHYCARDIA (CMS-HCC): Status: ACTIVE | Noted: 2020-08-20

## 2023-12-14 PROBLEM — E66.8 CONSTITUTIONAL OBESITY: Status: ACTIVE | Noted: 2019-08-09

## 2023-12-14 PROCEDURE — 3079F DIAST BP 80-89 MM HG: CPT | Performed by: STUDENT IN AN ORGANIZED HEALTH CARE EDUCATION/TRAINING PROGRAM

## 2023-12-14 PROCEDURE — 1036F TOBACCO NON-USER: CPT | Performed by: STUDENT IN AN ORGANIZED HEALTH CARE EDUCATION/TRAINING PROGRAM

## 2023-12-14 PROCEDURE — 3075F SYST BP GE 130 - 139MM HG: CPT | Performed by: STUDENT IN AN ORGANIZED HEALTH CARE EDUCATION/TRAINING PROGRAM

## 2023-12-14 PROCEDURE — 99213 OFFICE O/P EST LOW 20 MIN: CPT | Performed by: STUDENT IN AN ORGANIZED HEALTH CARE EDUCATION/TRAINING PROGRAM

## 2023-12-14 PROCEDURE — 95970 ALYS NPGT W/O PRGRMG: CPT | Performed by: STUDENT IN AN ORGANIZED HEALTH CARE EDUCATION/TRAINING PROGRAM

## 2023-12-14 RX ORDER — LANCETS 33 GAUGE
EACH MISCELLANEOUS
COMMUNITY
Start: 2023-09-13

## 2023-12-14 RX ORDER — FLUCONAZOLE 150 MG/1
TABLET ORAL
COMMUNITY
Start: 2023-01-18 | End: 2023-12-14 | Stop reason: WASHOUT

## 2023-12-14 RX ORDER — MECLIZINE HYDROCHLORIDE 25 MG/1
TABLET ORAL
COMMUNITY
Start: 2023-12-08 | End: 2023-12-14 | Stop reason: SDUPTHER

## 2023-12-14 RX ORDER — CYCLOBENZAPRINE HCL 5 MG
5 TABLET ORAL NIGHTLY PRN
COMMUNITY
Start: 2023-11-16

## 2023-12-14 RX ORDER — DOXYCYCLINE 100 MG/1
100 TABLET ORAL 2 TIMES DAILY
COMMUNITY
Start: 2023-04-24 | End: 2023-12-14 | Stop reason: WASHOUT

## 2023-12-14 RX ORDER — METRONIDAZOLE 7.5 MG/G
CREAM TOPICAL
COMMUNITY
Start: 2023-10-17

## 2023-12-14 RX ORDER — DULAGLUTIDE 1.5 MG/.5ML
INJECTION, SOLUTION SUBCUTANEOUS
COMMUNITY
Start: 2023-11-22

## 2023-12-14 RX ORDER — METFORMIN HYDROCHLORIDE 500 MG/1
500 TABLET, EXTENDED RELEASE ORAL 2 TIMES DAILY
COMMUNITY
Start: 2023-10-30

## 2023-12-14 RX ORDER — ESCITALOPRAM OXALATE 20 MG/1
20 TABLET ORAL DAILY
COMMUNITY
Start: 2023-10-06

## 2023-12-14 RX ORDER — ACETAMINOPHEN 500 MG
2 TABLET ORAL EVERY 6 HOURS PRN
COMMUNITY
Start: 2019-12-17

## 2023-12-14 RX ORDER — ROSUVASTATIN CALCIUM 5 MG/1
5 TABLET, COATED ORAL NIGHTLY
COMMUNITY
Start: 2023-09-22

## 2023-12-14 RX ORDER — DULAGLUTIDE 0.75 MG/.5ML
INJECTION, SOLUTION SUBCUTANEOUS
COMMUNITY
Start: 2023-04-12 | End: 2023-12-14 | Stop reason: WASHOUT

## 2023-12-14 RX ORDER — ALBUTEROL SULFATE 1.25 MG/3ML
1 SOLUTION RESPIRATORY (INHALATION)
COMMUNITY
End: 2024-03-12 | Stop reason: WASHOUT

## 2023-12-14 RX ORDER — MECLIZINE HYDROCHLORIDE 25 MG/1
25 TABLET ORAL 2 TIMES DAILY
Qty: 60 TABLET | Refills: 2 | Status: SHIPPED | OUTPATIENT
Start: 2023-12-14 | End: 2024-03-13

## 2023-12-14 RX ORDER — NYSTATIN AND TRIAMCINOLONE ACETONIDE 100000; 1 [USP'U]/G; MG/G
CREAM TOPICAL
COMMUNITY
Start: 2023-01-19 | End: 2023-12-14 | Stop reason: WASHOUT

## 2023-12-14 RX ORDER — CHOLECALCIFEROL (VITAMIN D3) 50 MCG
TABLET ORAL
COMMUNITY
Start: 2019-10-23

## 2023-12-14 RX ORDER — NYSTATIN 100000 [USP'U]/G
POWDER TOPICAL 2 TIMES DAILY
COMMUNITY
Start: 2023-01-18 | End: 2023-12-14 | Stop reason: WASHOUT

## 2023-12-14 RX ORDER — CLINDAMYCIN HYDROCHLORIDE 300 MG/1
CAPSULE ORAL
COMMUNITY
Start: 2023-10-24

## 2023-12-14 RX ORDER — BUDESONIDE AND FORMOTEROL FUMARATE DIHYDRATE 160; 4.5 UG/1; UG/1
2 AEROSOL RESPIRATORY (INHALATION) 2 TIMES DAILY
COMMUNITY

## 2023-12-14 RX ORDER — LOPERAMIDE HCL 2 MG
2 TABLET ORAL
COMMUNITY

## 2023-12-14 RX ORDER — AMITRIPTYLINE HYDROCHLORIDE 25 MG/1
TABLET, FILM COATED ORAL
Qty: 135 TABLET | Refills: 2 | Status: SHIPPED | OUTPATIENT
Start: 2023-12-14

## 2023-12-14 RX ORDER — BLOOD-GLUCOSE METER
EACH MISCELLANEOUS
COMMUNITY
Start: 2023-12-04

## 2023-12-14 RX ORDER — PRIMIDONE 250 MG/1
250 TABLET ORAL NIGHTLY
Qty: 90 TABLET | Refills: 2 | Status: SHIPPED | OUTPATIENT
Start: 2023-12-14

## 2023-12-14 RX ORDER — FEXOFENADINE HCL 60 MG
60 TABLET ORAL
COMMUNITY

## 2023-12-14 ASSESSMENT — PATIENT HEALTH QUESTIONNAIRE - PHQ9
2. FEELING DOWN, DEPRESSED OR HOPELESS: NOT AT ALL
1. LITTLE INTEREST OR PLEASURE IN DOING THINGS: NOT AT ALL
SUM OF ALL RESPONSES TO PHQ9 QUESTIONS 1 & 2: 0

## 2023-12-14 NOTE — PATIENT INSTRUCTIONS
"Thank you for your visit today. You were seen by Dr. Jara for essential tremor and chronic migraine. If you have any questions or need to reach me, call my office at [phone].    We discussed the following plan today:   The stimulator is working well  Raise amitriptyline to 25 mg at bedtime for a week, then 37.5 mg at bedtime  Continue primidone  Return in 3 months    To prevent medication overuse headache:  · Take your headache medication as prescribed.  · Avoid medications that contain butalbital or opioids.  · Use OTC painkillers less than 15 days a month.  · Limit use of triptans or combination analgesics to no more than nine days a month.    General guidelines that can help prevent most headaches:  · Avoid and recognize headache triggers: common triggers include fasting, certain foods and alcohols such as red wine, sleep deprivation, menstrual cycle, and stress  Some choose to take a \"headache diary\" over the course of one month. In a diary, you keep track of your severe headaches, and record information about how long each lasts, the location and intensity, what you did and ate before it came on, and whether it responded to treatment. This can be used to determine what triggers the migraines and what makes them better  · Stay hydrated. Be sure to drink plenty of water or other uncaffeinated fluids.  · Keep a regular sleep schedule and sleep at least 8-9 hours a night  · Exercise regularly and reduce stress.   · Obesity can contribute to headache development, so if you need to lose weight, find a program that works for you.  · If you smoke, talk to your doctor about quitting. Smoking is linked to a higher risk of medication overuse headache.   "

## 2023-12-14 NOTE — PROGRESS NOTES
Subjective     Shell Parker is a right handed  63 y.o. year old female who presents with Follow-up (1 year F/U- DBS).   Visit type: follow up visit     Tremor is about the same as before and she accomplishes all ADLs. No issues with writing or pouring or utensils.    Previous with infrequent headaches, now frequency has escalated more over the past 2 months to the point where now they are daily. Last all day if untreated. Pain is behind both eyes, feels like sharp but sometimes throbbing. Moderate intensity. Typically right-sided and then progressing to left side including parietal. No photophobia, phonophobia, but she does get nauseous. She will go into a dark room and sleep it off which helps. No aura.    She takes tylenol (?500) 2 tabs and 1 tab ibuprofen ?200 every day    PPX: amitriptyline 10mg at bedtime    Prior HX  She has been following with Dr. Caruso since initial evaluation at  in 2015, and seen most recently at [facility] about 1 year ago. Due to insurance, she is switching care back to [facility].      Today, she states the tremor started in the hands in her 20s. The tremor was not bad for about 10 years, but it slowly worsened over time. She developed vocal tremor. The tremor is present mostly with activity, but she has also noticed tremor at rest. She notices cutting with a knife is more difficult due to the tremor. She is unable to carry 2 cups of coffee as her left hand will shake and spill some. She does not have problems with the right hand due to the DBS. The tremor is worsened by stress, fatigue. She denies tremor involving head. She denies improvement with alcohol (only drinks 3 times per year). She endorses family history of essential tremor in mother and maternal aunt. Mother also has Parkinson's disease.      She charges her DBS once a week for 45 minutes.      She endorses hyposmia for 5 years.  She denies slowed movements, stiffness, or difficulty with balance or gait. She denies  memory problems, visual hallucinations, hypophonia, dysphagia, constipation, micrographia, or RBD symptoms.        Current tremor meds:  Primidone 250 mg qhs (caused drowsiness at or above 400 mg daily)    Patient Active Problem List   Diagnosis    Anxiety    Benign paroxysmal positional vertigo    Depressive disorder    Essential tremor    Mild cognitive impairment    Open wound of palate    Psychological factor affecting physical condition    Psychomotor deficit    Right upper extremity numbness    Vertigo    Condition not found    Abnormal finding on EKG    Acquired absence of other specified parts of digestive tract    Acute recurrent maxillary sinusitis    Acute serous otitis media    Adverse food reaction    Allergic rhinitis due to dust mite    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, subsequent encounter    Arthrodesis status    Asthma    Asthma, moderate persistent, poorly-controlled    Atherosclerosis of native coronary artery of native heart without angina pectoris    Bronchospasm    Cardiomyopathy (CMS/HCC)    Carpal tunnel syndrome    Cellulitis    Cervical radiculopathy    Combined forms of age-related cataract of both eyes    Concussion with no loss of consciousness, initial encounter    Constipation    Constitutional obesity    Degeneration of lumbar or lumbosacral intervertebral disc    Lumbosacral spondylosis without myelopathy    Difficult intubation    Displacement of lumbar intervertebral disc without myelopathy    Dorsalgia, unspecified    Lumbago    Dyspnea    Shortness of breath    Enthesopathy of hip region    Esophageal disorder    Essential hypertension    Hyperlipidemia    Left bundle branch block (LBBB)    Morbid (severe) obesity due to excess calories (CMS/HCC)    Nonspecific abnormal finding in stool contents    Obesity, Class III, BMI 40-49.9 (morbid obesity) (CMS/HCC)    Paroxysmal supraventricular tachycardia    PONV (postoperative nausea and vomiting)    Prsnl history of dis of the  bld/bld-form org/immun mechnsm    Pure hypercholesterolemia    S/P deep brain stimulator placement    Seasonal allergic rhinitis due to pollen    Snoring    Tremor    Type 2 diabetes mellitus without complication, without long-term current use of insulin (CMS/HCC)    Unspecified osteoarthritis, unspecified site    Chronic depression    Mood disorder (CMS/HCC)      Past Medical History:   Diagnosis Date    Atherosclerotic heart disease of native coronary artery without angina pectoris 04/06/2016    CAD (coronary artery disease)    Personal history of other diseases of the circulatory system 12/16/2015    History of left bundle branch block (LBBB)    Personal history of other diseases of the circulatory system 12/16/2015    History of angina pectoris    Personal history of other diseases of the female genital tract 04/06/2016    History of endometriosis      Past Surgical History:   Procedure Laterality Date    CARPAL TUNNEL RELEASE  12/16/2015    Neuroplasty Median Nerve At Carpal Tunnel    CHOLECYSTECTOMY  12/16/2015    Cholecystectomy    KNEE SURGERY  04/06/2016    Knee Surgery    LUMBAR LAMINECTOMY  12/16/2015    Laminectomy Lumbar    OOPHORECTOMY  04/06/2016    Oophorectomy    OTHER SURGICAL HISTORY  12/16/2015    Ovarian Cystectomy Right      Social History     Socioeconomic History    Marital status:      Spouse name: Not on file    Number of children: Not on file    Years of education: Not on file    Highest education level: Not on file   Occupational History    Not on file   Tobacco Use    Smoking status: Never    Smokeless tobacco: Never   Vaping Use    Vaping Use: Never used   Substance and Sexual Activity    Alcohol use: Never    Drug use: Never    Sexual activity: Defer   Other Topics Concern    Not on file   Social History Narrative    Not on file     Social Determinants of Health     Financial Resource Strain: Not on file   Food Insecurity: Not on file   Transportation Needs: Not on file    Physical Activity: Not on file   Stress: Not on file   Social Connections: Not on file   Intimate Partner Violence: Not on file   Housing Stability: Not on file      No family history on file.   Patient Health Questionnaire-2 Score: 0          Review of Systems  All other system have been reviewed and are negative for complaint.  Objective   Neurological Exam  EOM full range  Grade 2 postural + kinetic tremor LUE ; none in RUE  Normal gait  Normal RRM          Programming:  L VIM DBS Medtronic placed in 2016, Activa RC in July 2021  Battery level 100%, charges once weekly for 45 minutes  ZENY date July 5, 2035  System impedances okay.      Initial and final programming:  Group B - ACTIVE  L VIM 1+2- 3.5 V / 60 us / 155 Hz.  ohms, 3.6 mA        Group A - Inactive  L VIM 1+2- 3.5 V / 60 us / 130 Hz.      __________________________________________________________________     Monopolar review 08/17/2016:     0-C+ 0.5V tremor improved, 1.0V uncomfortable dizziness, could not proceed beyond this  1- C+ 1.0V tremor improved, 1.5V uncomfortable dizziness, could not proceed beyond this  2- C+ 0.5V tremor improved, 3.2V uncomfortable dizziness, could not proceed beyond this  3-C+ 0.5V tremor improved, 3.8V uncomfortable dizziness, could not proceed beyond this            Hemoglobin A1C   Date Value Ref Range Status   07/14/2022 5.9 (H) 4.3 - 5.6 % Final     Comment:     American Diabetes Association guidelines indicate that patients with HgbA1c in the range 5.7-6.4% are at increased risk for development of diabetes, and intervention by lifestyle modification may be beneficial. HgbA1c greater or equal to 6.5% is considered diagnostic of diabetes.     Estimated Average Glucose   Date Value Ref Range Status   07/14/2022 123 mg/dL Final     Comment:     eAG: (Estimated average glucose) is a calculated value from HgbA1c and is representative of the average blood glucose level in the last 2-3 month period.            Assessment/Plan   Diagnoses and all orders for this visit:  Essential tremor  -     primidone (Mysoline) 250 mg tablet; Take 1 tablet (250 mg) by mouth once daily at bedtime.  Intractable chronic migraine without aura and without status migrainosus  -     amitriptyline (Elavil) 25 mg tablet; 1 tablet at bedtime for 1 week, then 1.5 tabs at bedtime  S/P deep brain stimulator placement  Vertigo  -     meclizine (Antivert) 25 mg tablet; Take 1 tablet (25 mg) by mouth 2 times a day.    Shell Parker is a 63 y.o. year old female here for FUV for ET s/p Medtronic LVIM - she is doing well and no changes were needed for stimulator. Future option of RVIM is a possibility. She has new issue of chronic migraine without aura. Will start with raising her amitriptyline.    We discussed the following plan today:   The stimulator is working well  Raise amitriptyline to 25 mg at bedtime for a week, then 37.5 mg at bedtime  Continue primidone  Return in 3 months

## 2024-01-25 ENCOUNTER — OFFICE VISIT (OUTPATIENT)
Dept: ENDOCRINOLOGY | Age: 64
End: 2024-01-25
Payer: COMMERCIAL

## 2024-01-25 VITALS
HEART RATE: 74 BPM | HEIGHT: 61 IN | BODY MASS INDEX: 40.97 KG/M2 | OXYGEN SATURATION: 96 % | DIASTOLIC BLOOD PRESSURE: 73 MMHG | SYSTOLIC BLOOD PRESSURE: 120 MMHG | WEIGHT: 217 LBS

## 2024-01-25 DIAGNOSIS — E11.9 TYPE 2 DIABETES MELLITUS WITHOUT COMPLICATION, WITHOUT LONG-TERM CURRENT USE OF INSULIN (HCC): Primary | ICD-10-CM

## 2024-01-25 DIAGNOSIS — E66.01 MORBID OBESITY (HCC): ICD-10-CM

## 2024-01-25 LAB
CHP ED QC CHECK: ABNORMAL
GLUCOSE BLD-MCNC: 271 MG/DL
HBA1C MFR BLD: 6.9 %

## 2024-01-25 PROCEDURE — 3078F DIAST BP <80 MM HG: CPT | Performed by: INTERNAL MEDICINE

## 2024-01-25 PROCEDURE — 83036 HEMOGLOBIN GLYCOSYLATED A1C: CPT | Performed by: INTERNAL MEDICINE

## 2024-01-25 PROCEDURE — 3074F SYST BP LT 130 MM HG: CPT | Performed by: INTERNAL MEDICINE

## 2024-01-25 PROCEDURE — 3044F HG A1C LEVEL LT 7.0%: CPT | Performed by: INTERNAL MEDICINE

## 2024-01-25 PROCEDURE — 82962 GLUCOSE BLOOD TEST: CPT | Performed by: INTERNAL MEDICINE

## 2024-01-25 PROCEDURE — 99203 OFFICE O/P NEW LOW 30 MIN: CPT | Performed by: INTERNAL MEDICINE

## 2024-01-25 RX ORDER — AMITRIPTYLINE HYDROCHLORIDE 25 MG/1
25 TABLET, FILM COATED ORAL NIGHTLY
COMMUNITY
Start: 2024-01-07

## 2024-01-25 RX ORDER — METFORMIN HYDROCHLORIDE 500 MG/1
TABLET, EXTENDED RELEASE ORAL
Qty: 30 TABLET | Refills: 3 | Status: SHIPPED | OUTPATIENT
Start: 2024-01-25

## 2024-01-25 RX ORDER — DULAGLUTIDE 1.5 MG/.5ML
1.5 INJECTION, SOLUTION SUBCUTANEOUS WEEKLY
COMMUNITY
Start: 2023-11-22

## 2024-01-25 RX ORDER — TIRZEPATIDE 2.5 MG/.5ML
2.5 INJECTION, SOLUTION SUBCUTANEOUS WEEKLY
Qty: 4 EACH | Refills: 3 | Status: SHIPPED | OUTPATIENT
Start: 2024-01-25

## 2024-01-25 RX ORDER — LANCETS 33 GAUGE
EACH MISCELLANEOUS
COMMUNITY
Start: 2024-01-10

## 2024-01-25 RX ORDER — BLOOD SUGAR DIAGNOSTIC
STRIP MISCELLANEOUS
COMMUNITY
Start: 2024-01-09

## 2024-01-25 RX ORDER — METFORMIN HYDROCHLORIDE 500 MG/1
TABLET, EXTENDED RELEASE ORAL
Qty: 30 TABLET | Refills: 3 | Status: SHIPPED | OUTPATIENT
Start: 2024-01-25 | End: 2024-01-25 | Stop reason: SDUPTHER

## 2024-01-25 ASSESSMENT — ENCOUNTER SYMPTOMS
EYES NEGATIVE: 1
VISUAL CHANGE: 0
SHORTNESS OF BREATH: 1

## 2024-01-25 NOTE — TELEPHONE ENCOUNTER
Patient requesting medication refill. Please approve or deny this request.    Rx requested:  Requested Prescriptions     Pending Prescriptions Disp Refills    Tirzepatide (MOUNJARO) 2.5 MG/0.5ML SOPN SC injection 1 each 0     Sig: Inject 0.5 mLs into the skin once a week [id number], Disp: 1, Exp: 7/12/25         Last Office Visit:   1/25/2024      Next Visit Date:  Future Appointments   Date Time Provider Department Center   4/25/2024  2:00 PM Dawit Bang MD [facility]

## 2024-01-25 NOTE — PROGRESS NOTES
128/78     Patient referred here for uncontrolled diabetes obesity was seeing another endocrinologist at [facility] A1c has gone up from 5.9-6.9  Patient unable to get Trulicity due to shortages have changed her metformin to once a day  Obesity BMI 41  Blood sugars been 1-200 range denies any hypoglycemia    Diabetes  She presents for her initial diabetic visit. She has type 2 diabetes mellitus. Her disease course has been fluctuating. Pertinent negatives for diabetes include no polydipsia, no polyuria, no visual change and no weight loss. Symptoms are worsening. Risk factors for coronary artery disease include obesity. She is following a generally healthy diet. She never participates in exercise. Her overall blood glucose range is 180-200 mg/dl.        Range & Units 1 yr ago Comments   Hemoglobin A1C  4.3 - 5.6 % 5.9 High  American Diabetes Association guidelines indicate that patients with HgbA1c in the range 5.7-6.4% are at increased risk for development of diabetes, and intervention by lifestyle modification may be beneficial. HgbA1c greater or equal to 6.5% is considered diagnostic of diabetes.   Estimated Average Glucose  mg/dL 123        Past Medical History:   Diagnosis Date    Acute serous otitis media     Asthma     Atherosclerosis of native coronary artery of native heart without angina pectoris 9/26/2017    Cellulitis     of left toe    Chronic depression     CTS (carpal tunnel syndrome)     Diabetes mellitus (HCC)     Essential hypertension 8/14/2019    Hyperlipidemia     Lumbar radiculopathy     PSVT (paroxysmal supraventricular tachycardia)     Seasonal allergic rhinitis     Tremor     bilateral, nonspecific      Past Surgical History:   Procedure Laterality Date    BACK SURGERY      CARDIAC CATHETERIZATION  2008    Micro vascular angina     Social History     Socioeconomic History    Marital status:      Spouse name: Not on file    Number of children: Not on file    Years of education:

## 2024-02-06 ENCOUNTER — TELEPHONE (OUTPATIENT)
Dept: ENDOCRINOLOGY | Age: 64
End: 2024-02-06

## 2024-02-06 RX ORDER — DULAGLUTIDE 1.5 MG/.5ML
1.5 INJECTION, SOLUTION SUBCUTANEOUS WEEKLY
Qty: 4 ADJUSTABLE DOSE PRE-FILLED PEN SYRINGE | Refills: 3 | Status: SHIPPED | OUTPATIENT
Start: 2024-02-06

## 2024-02-06 RX ORDER — TIRZEPATIDE 2.5 MG/.5ML
2.5 INJECTION, SOLUTION SUBCUTANEOUS WEEKLY
Qty: 4 EACH | Refills: 3 | Status: SHIPPED | OUTPATIENT
Start: 2024-02-06

## 2024-02-06 NOTE — TELEPHONE ENCOUNTER
Patient called in to request to go back on Trulicity previously on 1.5 please send to [facility]

## 2024-02-06 NOTE — TELEPHONE ENCOUNTER
Patient requesting medication refill. Please approve or deny this request.    Rx requested:  Requested Prescriptions     Pending Prescriptions Disp Refills    Tirzepatide (MOUNJARO) 2.5 MG/0.5ML SOPN SC injection 4 each 3     Sig: Inject 0.5 mLs into the skin once a week         Last Office Visit:   1/25/2024      Next Visit Date:  Future Appointments   Date Time Provider Department Center   4/25/2024  2:00 PM Dawit Bang MD [facility]

## 2024-02-09 ENCOUNTER — HOSPITAL ENCOUNTER (OUTPATIENT)
Dept: GENERAL RADIOLOGY | Age: 64
Discharge: HOME | End: 2024-02-09
Payer: COMMERCIAL

## 2024-02-09 ENCOUNTER — TRANSCRIBE ORDERS (OUTPATIENT)
Dept: GENERAL RADIOLOGY | Age: 64
End: 2024-02-09

## 2024-02-09 DIAGNOSIS — R05.9 COUGH, UNSPECIFIED TYPE: ICD-10-CM

## 2024-02-09 DIAGNOSIS — R05.9 COUGH, UNSPECIFIED TYPE: Primary | ICD-10-CM

## 2024-02-09 PROCEDURE — 71046 X-RAY EXAM CHEST 2 VIEWS: CPT

## 2024-03-04 DIAGNOSIS — I47.10 PSVT (PAROXYSMAL SUPRAVENTRICULAR TACHYCARDIA) (HCC): ICD-10-CM

## 2024-03-04 RX ORDER — ATENOLOL 50 MG/1
TABLET ORAL
Qty: 90 TABLET | Refills: 3 | OUTPATIENT
Start: 2024-03-04

## 2024-03-04 NOTE — TELEPHONE ENCOUNTER
Patient needs OV. No refills given.     Requesting medication refill. Please approve or deny this request.    Rx requested:  Requested Prescriptions     Pending Prescriptions Disp Refills    atenolol (TENORMIN) 50 MG tablet [Pharmacy Med Name: ATENOLOL 50 MG TABLET] 90 tablet 3     Sig: take 1 tablet by mouth once daily         Last Office Visit:   9/27/2021      Next Visit Date:  Future Appointments   Date Time Provider Department Center   4/25/2024  2:00 PM Dawit Bang MD [facility]               Last refill 03/21/2023. Please approve or deny.

## 2024-03-06 RX ORDER — LANCETS 33 GAUGE
EACH MISCELLANEOUS
Qty: 100 EACH | Refills: 3 | Status: SHIPPED | OUTPATIENT
Start: 2024-03-06

## 2024-03-06 NOTE — TELEPHONE ENCOUNTER
Rx request   Requested Prescriptions     Pending Prescriptions Disp Refills    Lancets (ONETOUCH DELICA PLUS [id number]) MISC       LOV 1/25/2024  Next Visit Date:  Future Appointments   Date Time Provider Department Center   4/25/2024  2:00 PM Dawit aBng MD [facility]

## 2024-03-12 ENCOUNTER — OFFICE VISIT (OUTPATIENT)
Dept: NEUROLOGY | Facility: CLINIC | Age: 64
End: 2024-03-12
Payer: COMMERCIAL

## 2024-03-12 VITALS
SYSTOLIC BLOOD PRESSURE: 138 MMHG | BODY MASS INDEX: 40.89 KG/M2 | WEIGHT: 216.6 LBS | HEIGHT: 61 IN | HEART RATE: 80 BPM | DIASTOLIC BLOOD PRESSURE: 92 MMHG

## 2024-03-12 DIAGNOSIS — G43.719 INTRACTABLE CHRONIC MIGRAINE WITHOUT AURA AND WITHOUT STATUS MIGRAINOSUS: ICD-10-CM

## 2024-03-12 DIAGNOSIS — G25.0 ESSENTIAL TREMOR: Primary | ICD-10-CM

## 2024-03-12 PROCEDURE — 3080F DIAST BP >= 90 MM HG: CPT | Performed by: STUDENT IN AN ORGANIZED HEALTH CARE EDUCATION/TRAINING PROGRAM

## 2024-03-12 PROCEDURE — 1036F TOBACCO NON-USER: CPT | Performed by: STUDENT IN AN ORGANIZED HEALTH CARE EDUCATION/TRAINING PROGRAM

## 2024-03-12 PROCEDURE — 95970 ALYS NPGT W/O PRGRMG: CPT | Performed by: STUDENT IN AN ORGANIZED HEALTH CARE EDUCATION/TRAINING PROGRAM

## 2024-03-12 PROCEDURE — 99214 OFFICE O/P EST MOD 30 MIN: CPT | Performed by: STUDENT IN AN ORGANIZED HEALTH CARE EDUCATION/TRAINING PROGRAM

## 2024-03-12 PROCEDURE — 3075F SYST BP GE 130 - 139MM HG: CPT | Performed by: STUDENT IN AN ORGANIZED HEALTH CARE EDUCATION/TRAINING PROGRAM

## 2024-03-12 RX ORDER — ALBUTEROL SULFATE 90 UG/1
2 AEROSOL, METERED RESPIRATORY (INHALATION)
COMMUNITY
Start: 2024-02-05

## 2024-03-12 RX ORDER — PROMETHAZINE HYDROCHLORIDE AND DEXTROMETHORPHAN HYDROBROMIDE 6.25; 15 MG/5ML; MG/5ML
SYRUP ORAL
COMMUNITY
Start: 2024-02-13

## 2024-03-12 RX ORDER — TIRZEPATIDE 2.5 MG/.5ML
0.5 INJECTION, SOLUTION SUBCUTANEOUS
COMMUNITY
Start: 2024-01-25 | End: 2024-03-12 | Stop reason: WASHOUT

## 2024-03-12 NOTE — PROGRESS NOTES
Subjective     Shell Parker is a right handed  63 y.o. year old female who presents with Follow-up (DBS).   Visit type: follow up visit     Her headaches have improved significantly which she feels is because her blood sugars have improved with starting Trulicity. She has not taken tylenol in 2 months and has had headache only once a month or less. Amitriptyline seems to help, no side effects.    Tremor is stable since previous, it bothers her minimally. She accomplishes all tasks in spite of tremor.    Takes:   amitriptyline 37.5mg at bedtime  Primidone 250 mg qhs (caused drowsiness at or above 400 mg daily)    Prior HX  She has been following with Dr. Caruso since initial evaluation at  in 2015, and seen most recently at [facility] about 1 year ago. Due to insurance, she is switching care back to [facility].      Today, she states the tremor started in the hands in her 20s. The tremor was not bad for about 10 years, but it slowly worsened over time. She developed vocal tremor. The tremor is present mostly with activity, but she has also noticed tremor at rest. She notices cutting with a knife is more difficult due to the tremor. She is unable to carry 2 cups of coffee as her left hand will shake and spill some. She does not have problems with the right hand due to the DBS. The tremor is worsened by stress, fatigue. She denies tremor involving head. She denies improvement with alcohol (only drinks 3 times per year). She endorses family history of essential tremor in mother and maternal aunt. Mother also has Parkinson's disease.      She charges her DBS once a week for 45 minutes.      She endorses hyposmia for 5 years.  She denies slowed movements, stiffness, or difficulty with balance or gait. She denies memory problems, visual hallucinations, hypophonia, dysphagia, constipation, micrographia, or RBD symptoms.       Patient Active Problem List   Diagnosis    Anxiety    Benign paroxysmal positional vertigo     Depressive disorder    Essential tremor    Mild cognitive impairment    Open wound of palate    Psychological factor affecting physical condition    Psychomotor deficit    Right upper extremity numbness    Vertigo    Condition not found    Abnormal finding on EKG    Acquired absence of other specified parts of digestive tract    Acute recurrent maxillary sinusitis    Acute serous otitis media    Adverse food reaction    Allergic rhinitis due to dust mite    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, subsequent encounter    Arthrodesis status    Asthma    Asthma, moderate persistent, poorly-controlled    Atherosclerosis of native coronary artery of native heart without angina pectoris    Bronchospasm    Cardiomyopathy (CMS/HCC)    Carpal tunnel syndrome    Cervical radiculopathy    Combined forms of age-related cataract of both eyes    Concussion with no loss of consciousness, initial encounter    Constipation    Constitutional obesity    Degeneration of lumbar or lumbosacral intervertebral disc    Lumbosacral spondylosis without myelopathy    Difficult intubation    Displacement of lumbar intervertebral disc without myelopathy    Dorsalgia, unspecified    Lumbago    Dyspnea    Shortness of breath    Enthesopathy of hip region    Esophageal disorder    Essential hypertension    Hyperlipidemia    Left bundle branch block (LBBB)    Morbid (severe) obesity due to excess calories (CMS/HCC)    Nonspecific abnormal finding in stool contents    Obesity, Class III, BMI 40-49.9 (morbid obesity) (CMS/HCC)    Paroxysmal supraventricular tachycardia    PONV (postoperative nausea and vomiting)    Prsnl history of dis of the bld/bld-form org/immun mechnsm    Pure hypercholesterolemia    S/P deep brain stimulator placement    Seasonal allergic rhinitis due to pollen    Snoring    Type 2 diabetes mellitus without complication, without long-term current use of insulin (CMS/HCC)    Unspecified osteoarthritis, unspecified site    Chronic  depression    Mood disorder (CMS/HCC)    Intractable chronic migraine without aura and without status migrainosus      Past Medical History:   Diagnosis Date    Atherosclerotic heart disease of native coronary artery without angina pectoris 04/06/2016    CAD (coronary artery disease)    Personal history of other diseases of the circulatory system 12/16/2015    History of left bundle branch block (LBBB)    Personal history of other diseases of the circulatory system 12/16/2015    History of angina pectoris    Personal history of other diseases of the female genital tract 04/06/2016    History of endometriosis      Past Surgical History:   Procedure Laterality Date    CARPAL TUNNEL RELEASE  12/16/2015    Neuroplasty Median Nerve At Carpal Tunnel    CHOLECYSTECTOMY  12/16/2015    Cholecystectomy    KNEE SURGERY  04/06/2016    Knee Surgery    LUMBAR LAMINECTOMY  12/16/2015    Laminectomy Lumbar    OOPHORECTOMY  04/06/2016    Oophorectomy    OTHER SURGICAL HISTORY  12/16/2015    Ovarian Cystectomy Right      Social History     Socioeconomic History    Marital status:      Spouse name: Not on file    Number of children: Not on file    Years of education: Not on file    Highest education level: Not on file   Occupational History    Not on file   Tobacco Use    Smoking status: Never    Smokeless tobacco: Never   Vaping Use    Vaping Use: Never used   Substance and Sexual Activity    Alcohol use: Never    Drug use: Never    Sexual activity: Defer   Other Topics Concern    Not on file   Social History Narrative    Not on file     Social Determinants of Health     Financial Resource Strain: Not on file   Food Insecurity: Not on file   Transportation Needs: Not on file   Physical Activity: Not on file   Stress: Not on file   Social Connections: Not on file   Intimate Partner Violence: Not on file   Housing Stability: Not on file      No family history on file.   Patient Health Questionnaire-2 Score: 0          Review of  Systems  All other system have been reviewed and are negative for complaint.  Objective   Neurological Exam  EOM full range  Grade 2 postural + kinetic tremor LUE ; none in RUE  Normal gait  Normal RRM          Programming:  L VIM DBS Medtronic placed in 2016, Activa RC in July 2021  Battery level 75%, charges once weekly for 45 minutes  ZENY date July 5, 2035  System impedances okay.      Initial and final programming:  Group B - ACTIVE  L VIM 1+2- 3.5 V / 60 us / 155 Hz.  ohms, 3.7 mA        Group A - Inactive  L VIM 1+2- 3.5 V / 60 us / 130 Hz.      __________________________________________________________________     Monopolar review 08/17/2016:     0-C+ 0.5V tremor improved, 1.0V uncomfortable dizziness, could not proceed beyond this  1- C+ 1.0V tremor improved, 1.5V uncomfortable dizziness, could not proceed beyond this  2- C+ 0.5V tremor improved, 3.2V uncomfortable dizziness, could not proceed beyond this  3-C+ 0.5V tremor improved, 3.8V uncomfortable dizziness, could not proceed beyond this            Hemoglobin A1C   Date Value Ref Range Status   07/14/2022 5.9 (H) 4.3 - 5.6 % Final     Comment:     American Diabetes Association guidelines indicate that patients with HgbA1c in the range 5.7-6.4% are at increased risk for development of diabetes, and intervention by lifestyle modification may be beneficial. HgbA1c greater or equal to 6.5% is considered diagnostic of diabetes.     Estimated Average Glucose   Date Value Ref Range Status   07/14/2022 123 mg/dL Final     Comment:     eAG: (Estimated average glucose) is a calculated value from HgbA1c and is representative of the average blood glucose level in the last 2-3 month period.           Assessment/Plan     Shell Parker is a 63 y.o. year old female here for FUV for ET s/p Medtronic LVIM - she is doing well and no changes were needed for stimulator. Future option of RVIM is a possibility. Migraines have improved significantly with improvement of  blood glucose control as well as amitriptyline. She requested to lower it to 1 tab for convenience sake.    We discussed the following plan today:   The stimulator is working well  Amitriptyline 25 mg each night  Continue primidone  Return in 6 months

## 2024-03-12 NOTE — PATIENT INSTRUCTIONS
"Thank you for your visit today. You were seen by Dr. Jara for essential tremor and chronic migraine. If you have any questions or need to reach me, call my office at [phone].    We discussed the following plan today:   The stimulator is working well  Amitriptyline 25 mg each night  Continue primidone  Return in 6 months    To prevent medication overuse headache:  · Take your headache medication as prescribed.  · Avoid medications that contain butalbital or opioids.  · Use OTC painkillers less than 15 days a month.  · Limit use of triptans or combination analgesics to no more than nine days a month.    General guidelines that can help prevent most headaches:  · Avoid and recognize headache triggers: common triggers include fasting, certain foods and alcohols such as red wine, sleep deprivation, menstrual cycle, and stress  Some choose to take a \"headache diary\" over the course of one month. In a diary, you keep track of your severe headaches, and record information about how long each lasts, the location and intensity, what you did and ate before it came on, and whether it responded to treatment. This can be used to determine what triggers the migraines and what makes them better  · Stay hydrated. Be sure to drink plenty of water or other uncaffeinated fluids.  · Keep a regular sleep schedule and sleep at least 8-9 hours a night  · Exercise regularly and reduce stress.   · Obesity can contribute to headache development, so if you need to lose weight, find a program that works for you.  · If you smoke, talk to your doctor about quitting. Smoking is linked to a higher risk of medication overuse headache.   "

## 2024-04-23 RX ORDER — BLOOD SUGAR DIAGNOSTIC
STRIP MISCELLANEOUS
Qty: 100 EACH | Refills: 3 | Status: SHIPPED | OUTPATIENT
Start: 2024-04-23

## 2024-04-23 NOTE — TELEPHONE ENCOUNTER
Patient requesting medication refill. Please approve or deny this request.    Rx requested:  Requested Prescriptions     Pending Prescriptions Disp Refills    ONETOUCH VERIO strip 100 each 3     Sig: Use to test twice a day, E11.65         Last Office Visit:   1/25/2024      Next Visit Date:  Future Appointments   Date Time Provider Department Center   5/30/2024  2:30 PM Dawit Bang MD [facility]

## 2024-05-07 ENCOUNTER — TELEPHONE (OUTPATIENT)
Dept: ENDOCRINOLOGY | Age: 64
End: 2024-05-07

## 2024-05-07 DIAGNOSIS — E11.9 TYPE 2 DIABETES MELLITUS WITHOUT COMPLICATION, WITHOUT LONG-TERM CURRENT USE OF INSULIN (HCC): Primary | ICD-10-CM

## 2024-05-07 NOTE — TELEPHONE ENCOUNTER
Pt called and stated that the pharmacy told her that they don't know when they will have Trulicity, but they do have Ozempic. However, it will need a prior authorization.    Pt stated that she tried Mounjaro previously and she liked it. Samples were given to her by the office.     Pt wants to know if she can be given samples of Mounjaro until things can be straightened out with Trulicity or Ozempic.     Pt stated that Trulicity, Ozempic, and Mounjaro will all need pre authorization.    Pt also stated that her Metformin script says to take once per week, but she takes it once per day. Pharmacy told her to let the office know.

## 2024-05-08 RX ORDER — SEMAGLUTIDE 1.34 MG/ML
INJECTION, SOLUTION SUBCUTANEOUS
Qty: 3 ML | Refills: 3 | Status: SHIPPED | OUTPATIENT
Start: 2024-05-08

## 2024-05-14 RX ORDER — SEMAGLUTIDE 1.34 MG/ML
1 INJECTION, SOLUTION SUBCUTANEOUS
Qty: 2 ADJUSTABLE DOSE PRE-FILLED PEN SYRINGE | Refills: 3 | Status: CANCELLED | OUTPATIENT
Start: 2024-05-14

## 2024-05-15 RX ORDER — SEMAGLUTIDE 0.68 MG/ML
INJECTION, SOLUTION SUBCUTANEOUS
Qty: 1 ML | Refills: 0 | COMMUNITY
Start: 2024-05-15

## 2024-05-21 DIAGNOSIS — E11.9 TYPE 2 DIABETES MELLITUS WITHOUT COMPLICATION, WITHOUT LONG-TERM CURRENT USE OF INSULIN (HCC): ICD-10-CM

## 2024-05-21 LAB
ANION GAP SERPL CALCULATED.3IONS-SCNC: 12 MEQ/L (ref 9–15)
BUN SERPL-MCNC: 10 MG/DL (ref 8–23)
CALCIUM SERPL-MCNC: 9.2 MG/DL (ref 8.5–9.9)
CHLORIDE SERPL-SCNC: 101 MEQ/L (ref 95–107)
CO2 SERPL-SCNC: 24 MEQ/L (ref 20–31)
CREAT SERPL-MCNC: 0.65 MG/DL (ref 0.5–0.9)
ESTIMATED AVERAGE GLUCOSE: 103 MG/DL
GLUCOSE SERPL-MCNC: 129 MG/DL (ref 70–99)
HBA1C MFR BLD: 5.2 % (ref 4–6)
POTASSIUM SERPL-SCNC: 3.9 MEQ/L (ref 3.4–4.9)
SODIUM SERPL-SCNC: 137 MEQ/L (ref 135–144)

## 2024-05-21 NOTE — TELEPHONE ENCOUNTER
PA was approved , spoke to pharmacy and it was not going through on there end, faxed the PA approval to [facility]. And left a message for patient .

## 2024-05-23 DIAGNOSIS — E11.9 TYPE 2 DIABETES MELLITUS WITHOUT COMPLICATION, WITHOUT LONG-TERM CURRENT USE OF INSULIN (HCC): ICD-10-CM

## 2024-05-23 LAB
CREAT UR-MCNC: 142.1 MG/DL
MICROALBUMIN UR-MCNC: 2.2 MG/DL
MICROALBUMIN/CREAT UR-RTO: 15.5 MG/G (ref 0–30)

## 2024-05-30 ENCOUNTER — OFFICE VISIT (OUTPATIENT)
Dept: ENDOCRINOLOGY | Age: 64
End: 2024-05-30
Payer: COMMERCIAL

## 2024-05-30 VITALS
BODY MASS INDEX: 42.01 KG/M2 | HEIGHT: 60 IN | HEART RATE: 73 BPM | WEIGHT: 214 LBS | SYSTOLIC BLOOD PRESSURE: 105 MMHG | OXYGEN SATURATION: 95 % | DIASTOLIC BLOOD PRESSURE: 65 MMHG

## 2024-05-30 DIAGNOSIS — E11.9 TYPE 2 DIABETES MELLITUS WITHOUT COMPLICATION, WITHOUT LONG-TERM CURRENT USE OF INSULIN (HCC): Primary | ICD-10-CM

## 2024-05-30 LAB
CHP ED QC CHECK: NORMAL
GLUCOSE BLD-MCNC: 104 MG/DL

## 2024-05-30 PROCEDURE — 82962 GLUCOSE BLOOD TEST: CPT | Performed by: INTERNAL MEDICINE

## 2024-05-30 PROCEDURE — 3074F SYST BP LT 130 MM HG: CPT | Performed by: INTERNAL MEDICINE

## 2024-05-30 PROCEDURE — 99213 OFFICE O/P EST LOW 20 MIN: CPT | Performed by: INTERNAL MEDICINE

## 2024-05-30 PROCEDURE — 3078F DIAST BP <80 MM HG: CPT | Performed by: INTERNAL MEDICINE

## 2024-05-30 PROCEDURE — 3044F HG A1C LEVEL LT 7.0%: CPT | Performed by: INTERNAL MEDICINE

## 2024-05-30 NOTE — PROGRESS NOTES
5/30/2024    Assessment:       Diagnosis Orders   1. Type 2 diabetes mellitus without complication, without long-term current use of insulin ([facility])  POCT Glucose            PLAN:     Orders Placed This Encounter   Procedures    Hemoglobin A1C     Standing Status:   Future     Standing Expiration Date:   5/30/2025    Basic Metabolic Panel     Standing Status:   Future     Standing Expiration Date:   5/30/2025    Lipid Panel     Standing Status:   Future     Standing Expiration Date:   5/30/2025    POCT Glucose     DIABETES FOOT EXAM   Continue current dose of Ozempic plus metformin patient to follow-up in 6 months time          Orders Placed This Encounter   Procedures    POCT Glucose     No orders of the defined types were placed in this encounter.    No follow-ups on file.  Subjective:     Chief Complaint   Patient presents with    Diabetes    Obesity     Vitals:    05/30/24 1434   BP: 105/65   Pulse: 73   SpO2: 95%   Weight: 97.1 kg (214 lb)   Height: 1.524 m (5')     Wt Readings from Last 3 Encounters:   05/30/24 97.1 kg (214 lb)   01/25/24 98.4 kg (217 lb)   09/27/21 89.8 kg (198 lb)     BP Readings from Last 3 Encounters:   05/30/24 105/65   01/25/24 120/73   09/27/21 122/68       Follow-up on type 2 diabetes obesity  Metformin  Extended  Release 500 mg daily plus Ozempic 0.5 mg once a week hemoglobin A1c was 5.2 history of heart disease average blood sugars close to 120    Hemoglobin A1C       Date                     Value               Ref Range           Status                05/21/2024               5.2                 4.0 - 6.0 %         Final            ----------      Diabetes  She presents for her follow-up diabetic visit. She has type 2 diabetes mellitus. There are no hypoglycemic complications. Symptoms are stable. Diabetic complications include heart disease. Risk factors for coronary artery disease include obesity. She is following a generally healthy diet. Her overall blood glucose range is

## 2024-08-19 ENCOUNTER — TELEPHONE (OUTPATIENT)
Dept: NEUROLOGY | Facility: CLINIC | Age: 64
End: 2024-08-19
Payer: COMMERCIAL

## 2024-08-19 NOTE — TELEPHONE ENCOUNTER
I called Steve at [phone] and spoke to DION FOURNIER   Call ref# [id number]  No PA required for CPT 83397

## 2024-09-10 ENCOUNTER — APPOINTMENT (OUTPATIENT)
Dept: NEUROLOGY | Facility: CLINIC | Age: 64
End: 2024-09-10
Payer: COMMERCIAL

## 2024-09-10 VITALS
WEIGHT: 206.8 LBS | DIASTOLIC BLOOD PRESSURE: 75 MMHG | BODY MASS INDEX: 39.04 KG/M2 | SYSTOLIC BLOOD PRESSURE: 119 MMHG | HEIGHT: 61 IN | HEART RATE: 80 BPM

## 2024-09-10 DIAGNOSIS — G25.0 ESSENTIAL TREMOR: Primary | ICD-10-CM

## 2024-09-10 DIAGNOSIS — G43.719 INTRACTABLE CHRONIC MIGRAINE WITHOUT AURA AND WITHOUT STATUS MIGRAINOSUS: ICD-10-CM

## 2024-09-10 PROCEDURE — 99214 OFFICE O/P EST MOD 30 MIN: CPT | Performed by: STUDENT IN AN ORGANIZED HEALTH CARE EDUCATION/TRAINING PROGRAM

## 2024-09-10 PROCEDURE — 95970 ALYS NPGT W/O PRGRMG: CPT | Performed by: STUDENT IN AN ORGANIZED HEALTH CARE EDUCATION/TRAINING PROGRAM

## 2024-09-10 RX ORDER — MECLIZINE HYDROCHLORIDE 25 MG/1
TABLET ORAL
COMMUNITY
Start: 2024-07-26

## 2024-09-10 RX ORDER — SEMAGLUTIDE 1.34 MG/ML
INJECTION, SOLUTION SUBCUTANEOUS
COMMUNITY
Start: 2024-05-08

## 2024-09-10 RX ORDER — SEMAGLUTIDE 0.68 MG/ML
INJECTION, SOLUTION SUBCUTANEOUS
COMMUNITY
Start: 2024-08-31

## 2024-09-10 RX ORDER — PRIMIDONE 250 MG/1
250 TABLET ORAL NIGHTLY
Qty: 90 TABLET | Refills: 2 | Status: SHIPPED | OUTPATIENT
Start: 2024-09-10

## 2024-09-10 NOTE — PROGRESS NOTES
Subjective     Shell Parker is a right handed  63 y.o. year old female who presents with Follow-up (DBS).   Visit type: follow up visit     Her headaches still occur 1/month. Feels taking 1.5 tab amitriptyline helps. (Only takes extra 0.5 PRN otherwise takes 1 tab). No SE. Blood glucose continues to improve which also improves the headaches.     Continues to have no tremor on the R. Tremor on L is about the same. Uses R hand to stabilize left hand tremor. Feels like it is still tolerable.    Takes:  amitriptyline 37.5mg at bedtime  Primidone 250 mg qhs (caused drowsiness at or above 400 mg daily)    Prior HX  She has been following with Dr. Caruso since initial evaluation at  in 2015, and seen most recently at [facility] about 1 year ago. Due to insurance, she is switching care back to [facility].      Today, she states the tremor started in the hands in her 20s. The tremor was not bad for about 10 years, but it slowly worsened over time. She developed vocal tremor. The tremor is present mostly with activity, but she has also noticed tremor at rest. She notices cutting with a knife is more difficult due to the tremor. She is unable to carry 2 cups of coffee as her left hand will shake and spill some. She does not have problems with the right hand due to the DBS. The tremor is worsened by stress, fatigue. She denies tremor involving head. She denies improvement with alcohol (only drinks 3 times per year). She endorses family history of essential tremor in mother and maternal aunt. Mother also has Parkinson's disease.      She charges her DBS once a week for 45 minutes.      She endorses hyposmia for 5 years.  She denies slowed movements, stiffness, or difficulty with balance or gait. She denies memory problems, visual hallucinations, hypophonia, dysphagia, constipation, micrographia, or RBD symptoms.       Patient Active Problem List   Diagnosis    Anxiety    Benign paroxysmal positional vertigo    Depressive  disorder    Essential tremor    Mild cognitive impairment    Open wound of palate    Psychological factor affecting physical condition    Psychomotor deficit    Right upper extremity numbness    Vertigo    Condition not found    Abnormal finding on EKG    Acquired absence of other specified parts of digestive tract    Acute recurrent maxillary sinusitis    Acute serous otitis media    Adverse food reaction    Allergic rhinitis due to dust mite    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, subsequent encounter    Arthrodesis status    Asthma (HHS-HCC)    Asthma, moderate persistent, poorly-controlled (HHS-HCC)    Atherosclerosis of native coronary artery of native heart without angina pectoris    Bronchospasm    Cardiomyopathy (Multi)    Carpal tunnel syndrome    Cervical radiculopathy    Combined forms of age-related cataract of both eyes    Concussion with no loss of consciousness, initial encounter    Constipation    Constitutional obesity    Degeneration of lumbar or lumbosacral intervertebral disc    Lumbosacral spondylosis without myelopathy    Difficult intubation    Displacement of lumbar intervertebral disc without myelopathy    Dorsalgia, unspecified    Lumbago    Dyspnea    Shortness of breath    Enthesopathy of hip region    Esophageal disorder    Essential hypertension    Hyperlipidemia    Left bundle branch block (LBBB)    Morbid (severe) obesity due to excess calories (Multi)    Nonspecific abnormal finding in stool contents    Obesity, Class III, BMI 40-49.9 (morbid obesity) (Multi)    Paroxysmal supraventricular tachycardia (CMS-HCC)    PONV (postoperative nausea and vomiting)    Prsnl history of dis of the bld/bld-form org/immun mechnsm    Pure hypercholesterolemia    S/P deep brain stimulator placement    Seasonal allergic rhinitis due to pollen    Snoring    Type 2 diabetes mellitus without complication, without long-term current use of insulin (Multi)    Unspecified osteoarthritis, unspecified site     Chronic depression    Mood disorder (CMS-HCC)    Intractable chronic migraine without aura and without status migrainosus      Past Medical History:   Diagnosis Date    Atherosclerotic heart disease of native coronary artery without angina pectoris 04/06/2016    CAD (coronary artery disease)    Personal history of other diseases of the circulatory system 12/16/2015    History of left bundle branch block (LBBB)    Personal history of other diseases of the circulatory system 12/16/2015    History of angina pectoris    Personal history of other diseases of the female genital tract 04/06/2016    History of endometriosis      Past Surgical History:   Procedure Laterality Date    CARPAL TUNNEL RELEASE  12/16/2015    Neuroplasty Median Nerve At Carpal Tunnel    CHOLECYSTECTOMY  12/16/2015    Cholecystectomy    KNEE SURGERY  04/06/2016    Knee Surgery    LUMBAR LAMINECTOMY  12/16/2015    Laminectomy Lumbar    OOPHORECTOMY  04/06/2016    Oophorectomy    OTHER SURGICAL HISTORY  12/16/2015    Ovarian Cystectomy Right      Social History     Socioeconomic History    Marital status:      Spouse name: Not on file    Number of children: Not on file    Years of education: Not on file    Highest education level: Not on file   Occupational History    Not on file   Tobacco Use    Smoking status: Never    Smokeless tobacco: Never   Vaping Use    Vaping status: Never Used   Substance and Sexual Activity    Alcohol use: Never    Drug use: Never    Sexual activity: Defer   Other Topics Concern    Not on file   Social History Narrative    Not on file     Social Determinants of Health     Financial Resource Strain: Low Risk  (5/2/2022)    Received from [facility]    Overall Financial Resource Strain (CARDIA)     Difficulty of Paying Living Expenses: Not very hard   Food Insecurity: No Food Insecurity (5/2/2022)    Received from [facility]    Hunger Vital Sign     Worried About Running  Out of Food in the Last Year: Never true     Ran Out of Food in the Last Year: Never true   Transportation Needs: No Transportation Needs (5/2/2022)    Received from [facility]    PRAPARE - Transportation     Lack of Transportation (Medical): No     Lack of Transportation (Non-Medical): No   Physical Activity: Unknown (5/2/2022)    Received from [facility]    Exercise Vital Sign     Days of Exercise per Week: Patient declined     Minutes of Exercise per Session: Patient declined   Stress: No Stress Concern Present (5/2/2022)    Received from [facility]    [employer] [employer] - Occupational Stress Questionnaire     Feeling of Stress : Not at all   Social Connections: Moderately Integrated (5/2/2022)    Received from [facility]    Social Connection and Isolation Panel [NHANES]     Frequency of Communication with Friends and Family: More than three times a week     Frequency of Social Gatherings with Friends and Family: Once a week     Attends Synagogue Services: Never     Active Member of Clubs or Organizations: Yes     Attends Club or Organization Meetings: More than 4 times per year     Marital Status:    Intimate Partner Violence: Not on file   Housing Stability: Low Risk  (5/2/2022)    Received from [facility]    Housing Stability Vital Sign     Unable to Pay for Housing in the Last Year: No     Number of Places Lived in the Last Year: 1     Unstable Housing in the Last Year: No      No family history on file.   Patient Health Questionnaire-2 Score: 0          Review of Systems  All other system have been reviewed and are negative for complaint.  Objective   Neurological Exam  EOM full range  Grade 2 postural + kinetic tremor LUE ; none in RUE  Normal tone  Normal gait  Normal RRM    Programming:  L VIM DBS Medtronic placed in 2016, Activa RC in July 2021  Battery  level 75%, charges once weekly for 45 minutes  ZENY date July 5, 2035  System impedances okay.      Initial and final programming:  Group B - ACTIVE  L VIM 1+2- 3.5 V / 60 us / 155 Hz.  ohms, 3.7 mA        Group A - Inactive  L VIM 1+2- 3.5 V / 60 us / 130 Hz.      __________________________________________________________________     Monopolar review 08/17/2016:     0-C+ 0.5V tremor improved, 1.0V uncomfortable dizziness, could not proceed beyond this  1- C+ 1.0V tremor improved, 1.5V uncomfortable dizziness, could not proceed beyond this  2- C+ 0.5V tremor improved, 3.2V uncomfortable dizziness, could not proceed beyond this  3-C+ 0.5V tremor improved, 3.8V uncomfortable dizziness, could not proceed beyond this            Hemoglobin A1C   Date Value Ref Range Status   05/21/2024 5.2 4.0 - 6.0 % Final   07/14/2022 5.9 (H) 4.3 - 5.6 % Final     Comment:     American Diabetes Association guidelines indicate that patients with HgbA1c in the range 5.7-6.4% are at increased risk for development of diabetes, and intervention by lifestyle modification may be beneficial. HgbA1c greater or equal to 6.5% is considered diagnostic of diabetes.     Estimated Average Glucose   Date Value Ref Range Status   05/21/2024 103 mg/dL Final     Comment:     The ADA and AACC recommend providing the estimated average glucose  result to permit better patient understanding of their HBA1c result.  Performed at [facility], [address] [place] [id number]  [phone].   07/14/2022 123 mg/dL Final     Comment:     eAG: (Estimated average glucose) is a calculated value from HgbA1c and is representative of the average blood glucose level in the last 2-3 month period.           Assessment/Plan     Shell Parker is a 63 y.o. year old female here for FUV for ET s/p Medtronic LVIM - she is doing well and no changes were needed for stimulator. Future option of RVIM is a possibility but does not feel she needs it at this time.  Migraines have improved significantly with improvement of blood glucose control as well with 1.5 tab of amitriptyline.     We discussed the following plan today:   The stimulator is working well  Increase Amitriptyline 37.5 mg (1.5 tab) each night since this works better with no SE  Continue primidone 250 mg qhs  Return in 6 months

## 2024-10-16 ENCOUNTER — TELEPHONE (OUTPATIENT)
Dept: NEUROLOGY | Facility: CLINIC | Age: 64
End: 2024-10-16
Payer: COMMERCIAL

## 2024-10-16 DIAGNOSIS — G43.719 INTRACTABLE CHRONIC MIGRAINE WITHOUT AURA AND WITHOUT STATUS MIGRAINOSUS: ICD-10-CM

## 2024-10-16 RX ORDER — AMITRIPTYLINE HYDROCHLORIDE 25 MG/1
TABLET, FILM COATED ORAL
Qty: 135 TABLET | Refills: 2 | Status: SHIPPED | OUTPATIENT
Start: 2024-10-16

## 2024-11-25 RX ORDER — SEMAGLUTIDE 1.34 MG/ML
INJECTION, SOLUTION SUBCUTANEOUS
Qty: 3 ML | Refills: 3 | Status: SHIPPED | OUTPATIENT
Start: 2024-11-25

## 2024-11-26 DIAGNOSIS — E11.9 TYPE 2 DIABETES MELLITUS WITHOUT COMPLICATION, WITHOUT LONG-TERM CURRENT USE OF INSULIN (HCC): ICD-10-CM

## 2024-11-26 LAB
ANION GAP SERPL CALCULATED.3IONS-SCNC: 11 MEQ/L (ref 9–15)
BUN SERPL-MCNC: 9 MG/DL (ref 8–23)
CALCIUM SERPL-MCNC: 9.4 MG/DL (ref 8.5–9.9)
CHLORIDE SERPL-SCNC: 98 MEQ/L (ref 95–107)
CHOLEST SERPL-MCNC: 132 MG/DL (ref 0–199)
CO2 SERPL-SCNC: 27 MEQ/L (ref 20–31)
CREAT SERPL-MCNC: 0.65 MG/DL (ref 0.5–0.9)
ESTIMATED AVERAGE GLUCOSE: 103 MG/DL
GLUCOSE SERPL-MCNC: 119 MG/DL (ref 70–99)
HBA1C MFR BLD: 5.2 % (ref 4–6)
HDLC SERPL-MCNC: 52 MG/DL (ref 40–59)
LDLC SERPL CALC-MCNC: 65 MG/DL (ref 0–129)
POTASSIUM SERPL-SCNC: 4.4 MEQ/L (ref 3.4–4.9)
SODIUM SERPL-SCNC: 136 MEQ/L (ref 135–144)
TRIGL SERPL-MCNC: 73 MG/DL (ref 0–150)

## 2024-12-02 ENCOUNTER — OFFICE VISIT (OUTPATIENT)
Dept: ENDOCRINOLOGY | Age: 64
End: 2024-12-02
Payer: COMMERCIAL

## 2024-12-02 VITALS
BODY MASS INDEX: 40.64 KG/M2 | OXYGEN SATURATION: 95 % | WEIGHT: 207 LBS | HEIGHT: 60 IN | SYSTOLIC BLOOD PRESSURE: 128 MMHG | DIASTOLIC BLOOD PRESSURE: 74 MMHG | HEART RATE: 78 BPM

## 2024-12-02 DIAGNOSIS — E11.9 TYPE 2 DIABETES MELLITUS WITHOUT COMPLICATION, WITHOUT LONG-TERM CURRENT USE OF INSULIN (HCC): Primary | ICD-10-CM

## 2024-12-02 LAB
CHP ED QC CHECK: NORMAL
GLUCOSE BLD-MCNC: 90 MG/DL

## 2024-12-02 PROCEDURE — 3078F DIAST BP <80 MM HG: CPT | Performed by: INTERNAL MEDICINE

## 2024-12-02 PROCEDURE — 99213 OFFICE O/P EST LOW 20 MIN: CPT | Performed by: INTERNAL MEDICINE

## 2024-12-02 PROCEDURE — 82962 GLUCOSE BLOOD TEST: CPT | Performed by: INTERNAL MEDICINE

## 2024-12-02 PROCEDURE — 3044F HG A1C LEVEL LT 7.0%: CPT | Performed by: INTERNAL MEDICINE

## 2024-12-02 PROCEDURE — 3074F SYST BP LT 130 MM HG: CPT | Performed by: INTERNAL MEDICINE

## 2024-12-02 RX ORDER — METFORMIN HYDROCHLORIDE 500 MG/1
TABLET, EXTENDED RELEASE ORAL
Qty: 30 TABLET | Refills: 3 | Status: SHIPPED | OUTPATIENT
Start: 2024-12-02

## 2024-12-02 RX ORDER — BLOOD SUGAR DIAGNOSTIC
STRIP MISCELLANEOUS
Qty: 100 EACH | Refills: 3 | Status: SHIPPED | OUTPATIENT
Start: 2024-12-02

## 2024-12-02 RX ORDER — LANCETS 33 GAUGE
EACH MISCELLANEOUS
Qty: 100 EACH | Refills: 3 | Status: SHIPPED | OUTPATIENT
Start: 2024-12-02

## 2024-12-02 RX ORDER — SEMAGLUTIDE 1.34 MG/ML
INJECTION, SOLUTION SUBCUTANEOUS
Qty: 3 ADJUSTABLE DOSE PRE-FILLED PEN SYRINGE | Refills: 3 | Status: SHIPPED | OUTPATIENT
Start: 2024-12-02

## 2024-12-02 NOTE — PROGRESS NOTES
2024    Assessment:       Diagnosis Orders   1. Type 2 diabetes mellitus without complication, without long-term current use of insulin ([facility])  POCT Glucose            PLAN:     Orders Placed This Encounter   Procedures    Basic Metabolic Panel     Standing Status:   Future     Standing Expiration Date:   2025    Hemoglobin A1C     Standing Status:   Future     Standing Expiration Date:   2025    POCT Glucose     Orders Placed This Encounter   Medications    Lancets (ONETOUCH DELICA PLUS [id number]) MISC     Sig: use 1 LANCET to TEST BLOOD SUGAR twice a day     Dispense:  100 each     Refill:  3    ONETOUCH VERIO strip     Sig: Use to test twice a day, E11.65     Dispense:  100 each     Refill:  3    metFORMIN (GLUCOPHAGE-XR) 500 MG extended release tablet     Sig: Once a day updated rx     Dispense:  30 tablet     Refill:  3    Semaglutide, 1 MG/DOSE, (OZEMPIC, 1 MG/DOSE,) 4 MG/3ML SOPN sc injection     Si mg once a week     Dispense:  3 Adjustable Dose Pre-filled Pen Syringe     Refill:  3           Orders Placed This Encounter   Procedures    POCT Glucose     No orders of the defined types were placed in this encounter.    No follow-ups on file.  Subjective:     Chief Complaint   Patient presents with    Diabetes     Vitals:    24 1132   BP: 128/74   Pulse: 78   SpO2: 95%   Weight: 93.9 kg (207 lb)   Height: 1.524 m (5')     Wt Readings from Last 3 Encounters:   24 93.9 kg (207 lb)   24 97.1 kg (214 lb)   24 98.4 kg (217 lb)     BP Readings from Last 3 Encounters:   24 128/74   24 105/65   24 120/73       Follow-up on type 2 diabetes obesity patient has history of heart disease currently on Ozempic 0.5 mg once a week and metformin 500 mg daily average blood sugars close to 120 wants to increase the dose of Ozempic to 1 mg    Hemoglobin A1C       Date                     Value               Ref Range           Status                2024

## 2024-12-26 ENCOUNTER — TELEPHONE (OUTPATIENT)
Dept: ENDOCRINOLOGY | Age: 64
End: 2024-12-26

## 2024-12-26 NOTE — TELEPHONE ENCOUNTER
PATIENT CALLED STATING [facility] WILL NOT FILL HER OZEMPIC, IT NEEDS A PA. INCREASED 12/02/24 FROM 0.5 TO 1 MG. PATIENT ALSO VERIFIED HER INSURANCE IS THE SAME

## 2025-02-10 ENCOUNTER — TELEPHONE (OUTPATIENT)
Dept: NEUROLOGY | Facility: CLINIC | Age: 65
End: 2025-02-10
Payer: COMMERCIAL

## 2025-02-10 NOTE — TELEPHONE ENCOUNTER
I called Steve at [phone], spoke to Kailyn who transferred me to [phone] [facility]. Call ref# [id number]    No PA needed for [facility] [id number]

## 2025-03-04 ENCOUNTER — APPOINTMENT (OUTPATIENT)
Dept: NEUROLOGY | Facility: CLINIC | Age: 65
End: 2025-03-04
Payer: COMMERCIAL

## 2025-03-04 VITALS
HEART RATE: 80 BPM | WEIGHT: 205 LBS | SYSTOLIC BLOOD PRESSURE: 129 MMHG | BODY MASS INDEX: 38.71 KG/M2 | DIASTOLIC BLOOD PRESSURE: 77 MMHG | HEIGHT: 61 IN

## 2025-03-04 DIAGNOSIS — G43.719 INTRACTABLE CHRONIC MIGRAINE WITHOUT AURA AND WITHOUT STATUS MIGRAINOSUS: Primary | ICD-10-CM

## 2025-03-04 DIAGNOSIS — G25.0 ESSENTIAL TREMOR: ICD-10-CM

## 2025-03-04 PROCEDURE — 95970 ALYS NPGT W/O PRGRMG: CPT | Performed by: STUDENT IN AN ORGANIZED HEALTH CARE EDUCATION/TRAINING PROGRAM

## 2025-03-04 PROCEDURE — 99214 OFFICE O/P EST MOD 30 MIN: CPT | Performed by: STUDENT IN AN ORGANIZED HEALTH CARE EDUCATION/TRAINING PROGRAM

## 2025-03-04 RX ORDER — SEMAGLUTIDE 1.34 MG/ML
3 INJECTION, SOLUTION SUBCUTANEOUS
COMMUNITY
Start: 2025-03-03

## 2025-03-04 RX ORDER — METHYLPREDNISOLONE 4 MG/1
TABLET ORAL
Qty: 21 TABLET | Refills: 0 | Status: SHIPPED | OUTPATIENT
Start: 2025-03-04 | End: 2025-03-10

## 2025-03-04 ASSESSMENT — PATIENT HEALTH QUESTIONNAIRE - PHQ9
SUM OF ALL RESPONSES TO PHQ9 QUESTIONS 1 AND 2: 1
2. FEELING DOWN, DEPRESSED OR HOPELESS: SEVERAL DAYS
1. LITTLE INTEREST OR PLEASURE IN DOING THINGS: NOT AT ALL
10. IF YOU CHECKED OFF ANY PROBLEMS, HOW DIFFICULT HAVE THESE PROBLEMS MADE IT FOR YOU TO DO YOUR WORK, TAKE CARE OF THINGS AT HOME, OR GET ALONG WITH OTHER PEOPLE: SOMEWHAT DIFFICULT

## 2025-03-04 NOTE — PROGRESS NOTES
Subjective     Shell Parker is a right handed  64 y.o. year old female who presents with Fever (3 month FU.).   Visit type: follow up visit     Last seen 9/10/24 with no DBS programming as she was doing well. Also Amitriptyline increased to 37.5 mg for HA  and continued on primidone 250 mg at bedtime     Patient shares today that mother passed away 12/15/2024. She has since had a lot of stress which has worsened her headaches.  She is preoccupied by the suffering she witnessed her mother go through during her last days and questions whether she make the right decisions with her care. Headaches typically start after she has been laying down and thinking about her mom. They would stay with her until she gets up and takes something. This happens about 3 times a week. Also had 3 very bad headaches when she was exposed to bright light from sun reflecting on snow. Takes tylenol and when it is too bad she takes tylenol and ibuprofen. Taking a second cup of coffee later in the day takes the edge off the headaches. Headaches are same character as prior headaches, and she feels better in a quiet dark room.    Wondering whether she could go up to 2 tabs of Amitriptyline daily.    Still happy with the level of tremor control.    Takes:  amitriptyline 37.5mg at bedtime  Primidone 250 mg qhs (caused drowsiness at or above 400 mg daily)    Prior HX  She has been following with Dr. Caruso since initial evaluation at  in 2015, and seen most recently at [facility] about 1 year ago. Due to insurance, she is switching care back to [facility].      Today, she states the tremor started in the hands in her 20s. The tremor was not bad for about 10 years, but it slowly worsened over time. She developed vocal tremor. The tremor is present mostly with activity, but she has also noticed tremor at rest. She notices cutting with a knife is more difficult due to the tremor. She is unable to carry 2 cups of coffee as her left hand will  shake and spill some. She does not have problems with the right hand due to the DBS. The tremor is worsened by stress, fatigue. She denies tremor involving head. She denies improvement with alcohol (only drinks 3 times per year). She endorses family history of essential tremor in mother and maternal aunt. Mother also has Parkinson's disease.      She charges her DBS once a week for 45 minutes.      She endorses hyposmia for 5 years.  She denies slowed movements, stiffness, or difficulty with balance or gait. She denies memory problems, visual hallucinations, hypophonia, dysphagia, constipation, micrographia, or RBD symptoms.       Patient Active Problem List   Diagnosis    Anxiety    Benign paroxysmal positional vertigo    Depressive disorder    Essential tremor    Mild cognitive impairment    Open wound of palate    Psychological factor affecting physical condition    Psychomotor deficit    Right upper extremity numbness    Vertigo    Condition not found    Abnormal finding on EKG    Acquired absence of other specified parts of digestive tract    Acute recurrent maxillary sinusitis    Acute serous otitis media    Adverse food reaction    Allergic rhinitis due to dust mite    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, subsequent encounter    Arthrodesis status    Asthma    Asthma, moderate persistent, poorly-controlled (HHS-HCC)    Atherosclerosis of native coronary artery of native heart without angina pectoris    Bronchospasm    Cardiomyopathy    Carpal tunnel syndrome    Cervical radiculopathy    Combined forms of age-related cataract of both eyes    Concussion with no loss of consciousness, initial encounter    Constipation    Constitutional obesity    Degeneration of lumbar or lumbosacral intervertebral disc    Lumbosacral spondylosis without myelopathy    Difficult intubation    Displacement of lumbar intervertebral disc without myelopathy    Dorsalgia, unspecified    Lumbago    Dyspnea    Shortness of breath     Enthesopathy of hip region    Esophageal disorder    Essential hypertension    Hyperlipidemia    Left bundle branch block (LBBB)    Morbid (severe) obesity due to excess calories (Multi)    Nonspecific abnormal finding in stool contents    Obesity, Class III, BMI 40-49.9 (morbid obesity) (Multi)    Paroxysmal supraventricular tachycardia (CMS-HCC)    PONV (postoperative nausea and vomiting)    Prsnl history of dis of the bld/bld-form org/immun mechnsm    Pure hypercholesterolemia    S/P deep brain stimulator placement    Seasonal allergic rhinitis due to pollen    Snoring    Type 2 diabetes mellitus without complication, without long-term current use of insulin (Multi)    Unspecified osteoarthritis, unspecified site    Chronic depression    Mood disorder (CMS-HCC)    Intractable chronic migraine without aura and without status migrainosus      Past Medical History:   Diagnosis Date    Atherosclerotic heart disease of native coronary artery without angina pectoris 04/06/2016    CAD (coronary artery disease)    Personal history of other diseases of the circulatory system 12/16/2015    History of left bundle branch block (LBBB)    Personal history of other diseases of the circulatory system 12/16/2015    History of angina pectoris    Personal history of other diseases of the female genital tract 04/06/2016    History of endometriosis      Past Surgical History:   Procedure Laterality Date    CARPAL TUNNEL RELEASE  12/16/2015    Neuroplasty Median Nerve At Carpal Tunnel    CHOLECYSTECTOMY  12/16/2015    Cholecystectomy    KNEE SURGERY  04/06/2016    Knee Surgery    LUMBAR LAMINECTOMY  12/16/2015    Laminectomy Lumbar    OOPHORECTOMY  04/06/2016    Oophorectomy    OTHER SURGICAL HISTORY  12/16/2015    Ovarian Cystectomy Right      Social History     Socioeconomic History    Marital status:      Spouse name: Not on file    Number of children: Not on file    Years of education: Not on file    Highest education  level: Not on file   Occupational History    Not on file   Tobacco Use    Smoking status: Never    Smokeless tobacco: Never   Vaping Use    Vaping status: Never Used   Substance and Sexual Activity    Alcohol use: Never    Drug use: Never    Sexual activity: Defer   Other Topics Concern    Not on file   Social History Narrative    Not on file     Social Drivers of Health     Financial Resource Strain: Low Risk  (5/2/2022)    Received from [facility]    Overall Financial Resource Strain (CARDIA)     Difficulty of Paying Living Expenses: Not very hard   Food Insecurity: No Food Insecurity (5/2/2022)    Received from [facility]    Hunger Vital Sign     Worried About Running Out of Food in the Last Year: Never true     Ran Out of Food in the Last Year: Never true   Transportation Needs: No Transportation Needs (5/2/2022)    Received from [facility]    PRAPARE - Transportation     Lack of Transportation (Medical): No     Lack of Transportation (Non-Medical): No   Physical Activity: Unknown (5/2/2022)    Received from [facility]    Exercise Vital Sign     Days of Exercise per Week: Patient declined     Minutes of Exercise per Session: Patient declined   Stress: No Stress Concern Present (5/2/2022)    Received from [facility]    [employer] - Occupational Stress Questionnaire     Feeling of Stress : Not at all   Social Connections: Moderately Integrated (5/2/2022)    Received from [facility]    Social Connection and Isolation Panel [NHANES]     Frequency of Communication with Friends and Family: More than three times a week     Frequency of Social Gatherings with Friends and Family: Once a week     Attends Yazdanism Services: Never     Active Member of Clubs or Organizations: Yes     Attends Club or Organization Meetings: More than 4 times per year      Marital Status:    Intimate Partner Violence: Not on file   Housing Stability: Low Risk  (5/2/2022)    Received from [facility]    Housing Stability Vital Sign     Unable to Pay for Housing in the Last Year: No     Number of Places Lived in the Last Year: 1     Unstable Housing in the Last Year: No      No family history on file.   Patient Health Questionnaire-2 Score: 1          Review of Systems  All other system have been reviewed and are negative for complaint.  Objective   Neurological Exam  EOM full range  Grade 2 postural + kinetic tremor LUE ; none in RUE  Normal tone  Normal gait  L sided dis coordination (grade 2 in LUE and 1 in LLE)    Programming:  L VIM DBS Medtronic placed in 2016, Activa RC in July 2021  Battery level 75%, charges once weekly for 45 minutes  ZENY date July 5, 2035  System impedances okay.      Initial and final programming:  Group B - ACTIVE  L VIM 1+2- 3.5 V / 60 us / 155 Hz.  ohms, 3.7 mA        Group A - Inactive  L VIM 1+2- 3.5 V / 60 us / 130 Hz.      __________________________________________________________________     Monopolar review 08/17/2016:     0-C+ 0.5V tremor improved, 1.0V uncomfortable dizziness, could not proceed beyond this  1- C+ 1.0V tremor improved, 1.5V uncomfortable dizziness, could not proceed beyond this  2- C+ 0.5V tremor improved, 3.2V uncomfortable dizziness, could not proceed beyond this  3-C+ 0.5V tremor improved, 3.8V uncomfortable dizziness, could not proceed beyond this    Hemoglobin A1C   Date Value Ref Range Status   11/26/2024 5.2 4.0 - 6.0 % Final   07/14/2022 5.9 (H) 4.3 - 5.6 % Final     Comment:     American Diabetes Association guidelines indicate that patients with HgbA1c in the range 5.7-6.4% are at increased risk for development of diabetes, and intervention by lifestyle modification may be beneficial. HgbA1c greater or equal to 6.5% is considered diagnostic of diabetes.     Estimated Average Glucose    Date Value Ref Range Status   11/26/2024 103 mg/dL Final     Comment:     The ADA and AACC recommend providing the estimated average glucose  result to permit better patient understanding of their HBA1c result.  Performed at [facility], [facility] [place] [address]  [phone].   07/14/2022 123 mg/dL Final     Comment:     eAG: (Estimated average glucose) is a calculated value from HgbA1c and is representative of the average blood glucose level in the last 2-3 month period.       Assessment/Plan     Shell Parker is a 64 y.o. year old female here for FUV for ET s/p Medtronic LVIM - she is doing well and no changes were needed for stimulator. Future option of RVIM is a possibility but does not feel she needs it at this time. Migraines initially improved significantly with improvement of blood glucose control as well with 1.5 tab of amitriptyline.  Since the passing of her mother in 12/2024, she has been emotionally stressed and these have caused her headaches to worsen.  She is now having about 3 headaches per week.  Headaches are same as previously, just more frequent.    We discussed the following plan today:   - We will give you a short course of steroids to try and bring the headaches back down. Please follow instructions on the package. Even though it says to take some of the doses at night, you can take it during the day so as to avoid it causing insomnia  - If the steroids do not help with the headache you can consider increasing Amitriptyline to 2 tablets every night  - We did a DBS check and everything looks good. Your tremors remain well controlled so we did not do any change.  - Come back to see us in 3-4 months.

## 2025-03-04 NOTE — PATIENT INSTRUCTIONS
1/3 RSV positive  Chest x-ray without acute findings  Completed 5 days of steroid therapy  Symptoms resolved    Pleasure seeing you today. Sorry to hear about your mom's passing. The increase in headaches Is likely related to the emotional stress you are going through. Plan is as follows:  - We will give you a short course of steroids to try and bring the headaches back down. Please follow instructions on the package. Even though it says to take some of the doses at night, you can take it during the day so as to avoid it causing insomnia  - If the steroids do not help with the headache you can consider increasing Amitriptyline to 2 tablets every night  - We did a DBS check and everything looks good. Your tremors remain well controlled so we did not do any change.  - Come back to see us in 3-4 months.

## 2025-03-07 DIAGNOSIS — E11.9 TYPE 2 DIABETES MELLITUS WITHOUT COMPLICATION, WITHOUT LONG-TERM CURRENT USE OF INSULIN (HCC): ICD-10-CM

## 2025-03-07 RX ORDER — METFORMIN HYDROCHLORIDE 500 MG/1
TABLET, EXTENDED RELEASE ORAL
Qty: 30 TABLET | Refills: 3 | Status: SHIPPED | OUTPATIENT
Start: 2025-03-07

## 2025-03-18 DIAGNOSIS — G25.0 ESSENTIAL TREMOR: ICD-10-CM

## 2025-03-18 RX ORDER — PRIMIDONE 250 MG/1
250 TABLET ORAL NIGHTLY
Qty: 90 TABLET | Refills: 2 | Status: SHIPPED | OUTPATIENT
Start: 2025-03-18

## 2025-03-18 NOTE — TELEPHONE ENCOUNTER
Pharmacy sent RX refill request for Primidone 250 mg via fax. RX pending, DBS visit scheduled for 6/5/25.

## 2025-03-21 ENCOUNTER — TRANSCRIBE ORDERS (OUTPATIENT)
Dept: ADMINISTRATIVE | Age: 65
End: 2025-03-21

## 2025-03-21 DIAGNOSIS — Z12.31 ENCOUNTER FOR SCREENING MAMMOGRAM FOR MALIGNANT NEOPLASM OF BREAST: Primary | ICD-10-CM

## 2025-03-26 DIAGNOSIS — E11.9 TYPE 2 DIABETES MELLITUS WITHOUT COMPLICATION, WITHOUT LONG-TERM CURRENT USE OF INSULIN: ICD-10-CM

## 2025-03-26 LAB
ANION GAP SERPL CALCULATED.3IONS-SCNC: 11 MEQ/L (ref 9–15)
BUN SERPL-MCNC: 9 MG/DL (ref 8–23)
CALCIUM SERPL-MCNC: 9 MG/DL (ref 8.5–9.9)
CHLORIDE SERPL-SCNC: 102 MEQ/L (ref 95–107)
CO2 SERPL-SCNC: 26 MEQ/L (ref 20–31)
CREAT SERPL-MCNC: 0.56 MG/DL (ref 0.5–0.9)
ESTIMATED AVERAGE GLUCOSE: 97 MG/DL
GLUCOSE SERPL-MCNC: 99 MG/DL (ref 70–99)
HBA1C MFR BLD: 5 % (ref 4–6)
POTASSIUM SERPL-SCNC: 4.4 MEQ/L (ref 3.4–4.9)
SODIUM SERPL-SCNC: 139 MEQ/L (ref 135–144)

## 2025-04-02 ENCOUNTER — OFFICE VISIT (OUTPATIENT)
Age: 65
End: 2025-04-02
Payer: COMMERCIAL

## 2025-04-02 VITALS
OXYGEN SATURATION: 96 % | BODY MASS INDEX: 38.89 KG/M2 | WEIGHT: 206 LBS | HEART RATE: 76 BPM | HEIGHT: 61 IN | SYSTOLIC BLOOD PRESSURE: 147 MMHG | DIASTOLIC BLOOD PRESSURE: 71 MMHG

## 2025-04-02 DIAGNOSIS — E66.01 MORBID OBESITY: ICD-10-CM

## 2025-04-02 DIAGNOSIS — E11.9 TYPE 2 DIABETES MELLITUS WITHOUT COMPLICATION, WITHOUT LONG-TERM CURRENT USE OF INSULIN: Primary | ICD-10-CM

## 2025-04-02 PROCEDURE — 3044F HG A1C LEVEL LT 7.0%: CPT | Performed by: INTERNAL MEDICINE

## 2025-04-02 PROCEDURE — 3078F DIAST BP <80 MM HG: CPT | Performed by: INTERNAL MEDICINE

## 2025-04-02 PROCEDURE — 3077F SYST BP >= 140 MM HG: CPT | Performed by: INTERNAL MEDICINE

## 2025-04-02 PROCEDURE — 99213 OFFICE O/P EST LOW 20 MIN: CPT | Performed by: INTERNAL MEDICINE

## 2025-04-02 RX ORDER — SEMAGLUTIDE 1.34 MG/ML
INJECTION, SOLUTION SUBCUTANEOUS
Qty: 3 ADJUSTABLE DOSE PRE-FILLED PEN SYRINGE | Refills: 3 | Status: CANCELLED | OUTPATIENT
Start: 2025-04-02

## 2025-04-02 NOTE — PROGRESS NOTES
daily, Disp: , Rfl:     DiphenhydrAMINE HCl (BENADRYL ALLERGY PO), Take  by mouth as needed., Disp: , Rfl:     aspirin 81 MG EC tablet, Take 1 tablet by mouth daily, Disp: , Rfl:     meclizine (ANTIVERT) 25 MG tablet, Take 1 tablet by mouth 2 times daily, Disp: 180 tablet, Rfl: 3  Lab Results   Component Value Date     03/26/2025    K 4.4 03/26/2025     03/26/2025    CO2 26 03/26/2025    BUN 9 03/26/2025    CREATININE 0.56 03/26/2025    GLUCOSE 99 03/26/2025    CALCIUM 9.0 03/26/2025    BILITOT 0.3 12/17/2019    ALKPHOS 175 (H) 12/17/2019    AST 36 (H) 12/17/2019    ALT 29 12/17/2019    LABGLOM >90.0 03/26/2025    GFRAA >60.0 12/17/2019    GLOB 3.4 12/17/2019     Lab Results   Component Value Date    WBC 6.4 12/17/2019    HGB 13.9 12/17/2019    HCT 40.1 12/17/2019    MCV 97.0 12/17/2019     12/17/2019     Lab Results   Component Value Date    LABA1C 5.0 03/26/2025    LABA1C 5.2 11/26/2024    LABA1C 5.2 05/21/2024     Lab Results   Component Value Date    HDL 52 11/26/2024    HDL 56 09/17/2012    CHOL 132 11/26/2024    CHOL 175 09/17/2012    TRIG 73 11/26/2024    TRIG 187 (H) 09/17/2012     No results found for: \"TESTM\"  No results found for: \"TSH\", \"FT3\", \"T4FREE\"  No results found for: \"TPOABS\"    Review of Systems   Endocrine: Negative for polydipsia and polyuria.       Objective:   Physical Exam  Vitals reviewed.   Constitutional:       General: She is not in acute distress.     Appearance: Normal appearance. She is obese.   HENT:      Head: Normocephalic and atraumatic.      Right Ear: External ear normal.      Left Ear: External ear normal.      Nose: Nose normal.   Eyes:      General: No scleral icterus.        Right eye: No discharge.         Left eye: No discharge.      Extraocular Movements: Extraocular movements intact.      Conjunctiva/sclera: Conjunctivae normal.   Cardiovascular:      Rate and Rhythm: Normal rate.   Pulmonary:      Effort: Pulmonary effort is normal.

## 2025-05-02 ENCOUNTER — TELEPHONE (OUTPATIENT)
Age: 65
End: 2025-05-02

## 2025-05-02 NOTE — TELEPHONE ENCOUNTER
Prior authorization has been started on Cover My Meds for Ozempic 8 mg clinical uploaded, authorization pending Key: [id number]  Ozempic (2 MG/DOSE) 8MG/3ML pen-injectors    Approved. This drug has been approved. Approved quantity: 3 units per 28 day(s). The drug has been approved from 05/02/2025 to 05/02/2026. Please call the pharmacy to process your prescription claim. Generic or biosimilar substitution may be required when available and preferred on the formulary.. Authorization Expiration Date: May 2, 2026.

## 2025-06-05 ENCOUNTER — APPOINTMENT (OUTPATIENT)
Dept: NEUROLOGY | Facility: CLINIC | Age: 65
End: 2025-06-05
Payer: COMMERCIAL

## 2025-06-05 VITALS
HEART RATE: 78 BPM | HEIGHT: 61 IN | BODY MASS INDEX: 39.57 KG/M2 | DIASTOLIC BLOOD PRESSURE: 70 MMHG | SYSTOLIC BLOOD PRESSURE: 120 MMHG | WEIGHT: 209.6 LBS

## 2025-06-05 DIAGNOSIS — G25.0 ESSENTIAL TREMOR: Primary | ICD-10-CM

## 2025-06-05 DIAGNOSIS — G43.719 INTRACTABLE CHRONIC MIGRAINE WITHOUT AURA AND WITHOUT STATUS MIGRAINOSUS: ICD-10-CM

## 2025-06-05 PROCEDURE — 95970 ALYS NPGT W/O PRGRMG: CPT | Performed by: STUDENT IN AN ORGANIZED HEALTH CARE EDUCATION/TRAINING PROGRAM

## 2025-06-05 PROCEDURE — 99214 OFFICE O/P EST MOD 30 MIN: CPT | Performed by: STUDENT IN AN ORGANIZED HEALTH CARE EDUCATION/TRAINING PROGRAM

## 2025-06-05 RX ORDER — AMITRIPTYLINE HYDROCHLORIDE 25 MG/1
TABLET, FILM COATED ORAL
Start: 2025-06-05

## 2025-06-05 ASSESSMENT — PATIENT HEALTH QUESTIONNAIRE - PHQ9
1. LITTLE INTEREST OR PLEASURE IN DOING THINGS: NOT AT ALL
SUM OF ALL RESPONSES TO PHQ9 QUESTIONS 1 AND 2: 0
2. FEELING DOWN, DEPRESSED OR HOPELESS: NOT AT ALL

## 2025-06-05 NOTE — PATIENT INSTRUCTIONS
Thank you for your visit today. You were seen by Dr. Jara for migraine and essential tremor. If you have any questions or need to reach me, call my office at [phone].    We discussed the following plan today:   Continue the same dose of primidone  Reduce amitriptyline to 25 mg each night  Return in 6 months

## 2025-06-05 NOTE — PROGRESS NOTES
Subjective     Shell Parker is a right handed  64 y.o. year old female who presents with Follow-up (3 month FU.).   Visit type: follow up visit     Her life stress has improved and as such her migraines have improved considerably. She gets headaches around twice a month. She takes tylenol and/or ibuprofen as needed which works well to get rid of the head. The tremor seems to be stable. Her balance has been good with no falls.    Takes:  amitriptyline 50mg at bedtime  Primidone 250 mg qhs (caused drowsiness at or above 400 mg daily)  Atenolol 50 mg daily    Prior HX  She has been following with Dr. Caruso since initial evaluation at  in 2015, and seen most recently at [facility] about 1 year ago. Due to insurance, she is switching care back to [facility].      Today, she states the tremor started in the hands in her 20s. The tremor was not bad for about 10 years, but it slowly worsened over time. She developed vocal tremor. The tremor is present mostly with activity, but she has also noticed tremor at rest. She notices cutting with a knife is more difficult due to the tremor. She is unable to carry 2 cups of coffee as her left hand will shake and spill some. She does not have problems with the right hand due to the DBS. The tremor is worsened by stress, fatigue. She denies tremor involving head. She denies improvement with alcohol (only drinks 3 times per year). She endorses family history of essential tremor in mother and maternal aunt. Mother also has Parkinson's disease.      She charges her DBS once a week for 45 minutes.      She endorses hyposmia for 5 years.  She denies slowed movements, stiffness, or difficulty with balance or gait. She denies memory problems, visual hallucinations, hypophonia, dysphagia, constipation, micrographia, or RBD symptoms.       Patient Active Problem List   Diagnosis    Anxiety    Benign paroxysmal positional vertigo    Depressive disorder    Essential tremor    Mild  cognitive impairment    Open wound of palate    Psychological factor affecting physical condition    Psychomotor deficit    Right upper extremity numbness    Vertigo    Condition not found    Abnormal finding on EKG    Acquired absence of other specified parts of digestive tract    Acute recurrent maxillary sinusitis    Acute serous otitis media    Adverse food reaction    Allergic rhinitis due to dust mite    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, subsequent encounter    Arthrodesis status    Asthma    Asthma, moderate persistent, poorly-controlled (HHS-HCC)    Atherosclerosis of native coronary artery of native heart without angina pectoris    Bronchospasm    Cardiomyopathy    Carpal tunnel syndrome    Cervical radiculopathy    Combined forms of age-related cataract of both eyes    Concussion with no loss of consciousness, initial encounter    Constipation    Constitutional obesity    Degeneration of lumbar or lumbosacral intervertebral disc    Lumbosacral spondylosis without myelopathy    Difficult intubation    Displacement of lumbar intervertebral disc without myelopathy    Dorsalgia, unspecified    Lumbago    Dyspnea    Shortness of breath    Enthesopathy of hip region    Esophageal disorder    Essential hypertension    Hyperlipidemia    Left bundle branch block (LBBB)    Morbid (severe) obesity due to excess calories (Multi)    Nonspecific abnormal finding in stool contents    Obesity, Class III, BMI 40-49.9 (morbid obesity)    Paroxysmal supraventricular tachycardia    PONV (postoperative nausea and vomiting)    Prsnl history of dis of the bld/bld-form org/immun mechnsm    Pure hypercholesterolemia    S/P deep brain stimulator placement    Seasonal allergic rhinitis due to pollen    Snoring    Type 2 diabetes mellitus without complication, without long-term current use of insulin    Unspecified osteoarthritis, unspecified site    Chronic depression    Mood disorder    Intractable chronic migraine without aura  and without status migrainosus      Past Medical History:   Diagnosis Date    Atherosclerotic heart disease of native coronary artery without angina pectoris 04/06/2016    CAD (coronary artery disease)    Personal history of other diseases of the circulatory system 12/16/2015    History of left bundle branch block (LBBB)    Personal history of other diseases of the circulatory system 12/16/2015    History of angina pectoris    Personal history of other diseases of the female genital tract 04/06/2016    History of endometriosis      Past Surgical History:   Procedure Laterality Date    CARPAL TUNNEL RELEASE  12/16/2015    Neuroplasty Median Nerve At Carpal Tunnel    CHOLECYSTECTOMY  12/16/2015    Cholecystectomy    KNEE SURGERY  04/06/2016    Knee Surgery    LUMBAR LAMINECTOMY  12/16/2015    Laminectomy Lumbar    OOPHORECTOMY  04/06/2016    Oophorectomy    OTHER SURGICAL HISTORY  12/16/2015    Ovarian Cystectomy Right      Social History     Socioeconomic History    Marital status:      Spouse name: Not on file    Number of children: Not on file    Years of education: Not on file    Highest education level: Not on file   Occupational History    Not on file   Tobacco Use    Smoking status: Never    Smokeless tobacco: Never   Vaping Use    Vaping status: Never Used   Substance and Sexual Activity    Alcohol use: Never    Drug use: Never    Sexual activity: Defer   Other Topics Concern    Not on file   Social History Narrative    Not on file     Social Drivers of Health     Financial Resource Strain: Low Risk  (5/2/2022)    Received from [facility]    Overall Financial Resource Strain (CARDIA)     Difficulty of Paying Living Expenses: Not very hard   Food Insecurity: No Food Insecurity (5/2/2022)    Received from [facility]    Hunger Vital Sign     Worried About Running Out of Food in the Last Year: Never true     Ran Out of Food in the Last Year: Never true   Transportation Needs: No Transportation  Needs (5/2/2022)    Received from [facility]    PRAPARE - Transportation     Lack of Transportation (Medical): No     Lack of Transportation (Non-Medical): No   Physical Activity: Unknown (5/2/2022)    Received from [facility]    Exercise Vital Sign     Days of Exercise per Week: Patient declined     Minutes of Exercise per Session: Patient declined   Stress: No Stress Concern Present (5/2/2022)    Received from [facility]    Citizen of [place] of Occupational Health - Occupational Stress Questionnaire     Feeling of Stress : Not at all   Social Connections: Moderately Integrated (5/2/2022)    Received from [facility]    Social Connection and Isolation Panel [NHANES]     Frequency of Communication with Friends and Family: More than three times a week     Frequency of Social Gatherings with Friends and Family: Once a week     Attends Jain Services: Never     Active Member of Clubs or Organizations: Yes     Attends Club or Organization Meetings: More than 4 times per year     Marital Status:    Intimate Partner Violence: Not on file   Housing Stability: Low Risk  (5/2/2022)    Received from [facility]    Housing Stability Vital Sign     Unable to Pay for Housing in the Last Year: No     Number of Places Lived in the Last Year: 1     Unstable Housing in the Last Year: No      No family history on file.   Patient Health Questionnaire-2 Score: 0          Review of Systems  All other system have been reviewed and are negative for complaint.  Objective   Neurological Exam  EOM full range  Grade 2 postural + kinetic tremor LUE ; none in RUE  Normal tone  Normal gait    Programming:  L VIM DBS Medtronic placed in 2016, Activa RC in July 2021  Battery level 75%, charges once weekly for 45 minutes  ZENY date July 5, 2035  System impedances okay.      Initial and final programming:  Group B - ACTIVE  L VIM 1+2- 3.5 V / 60 us / 155 Hz.  ohms, 3.7 mA        Group A - Inactive  L VIM  1+2- 3.5 V / 60 us / 130 Hz.      __________________________________________________________________     Monopolar review 08/17/2016:     0-C+ 0.5V tremor improved, 1.0V uncomfortable dizziness, could not proceed beyond this  1- C+ 1.0V tremor improved, 1.5V uncomfortable dizziness, could not proceed beyond this  2- C+ 0.5V tremor improved, 3.2V uncomfortable dizziness, could not proceed beyond this  3-C+ 0.5V tremor improved, 3.8V uncomfortable dizziness, could not proceed beyond this    Hemoglobin A1C   Date Value Ref Range Status   11/26/2024 5.2 4.0 - 6.0 % Final   07/14/2022 5.9 (H) 4.3 - 5.6 % Final     Comment:     American Diabetes Association guidelines indicate that patients with HgbA1c in the range 5.7-6.4% are at increased risk for development of diabetes, and intervention by lifestyle modification may be beneficial. HgbA1c greater or equal to 6.5% is considered diagnostic of diabetes.     Estimated Average Glucose   Date Value Ref Range Status   11/26/2024 103 mg/dL Final     Comment:     The ADA and AACC recommend providing the estimated average glucose  result to permit better patient understanding of their HBA1c result.  Performed at [facility], [address] [place] [id number]  [phone].   07/14/2022 123 mg/dL Final     Comment:     eAG: (Estimated average glucose) is a calculated value from HgbA1c and is representative of the average blood glucose level in the last 2-3 month period.       Assessment/Plan     Shell Parker is a 64 y.o. year old female here for FUV for ET s/p Medtronic LVIM - she is doing well and no changes were needed for stimulator. Future option of RVIM is a possibility but does not feel she needs it at this time. Her migraines have improved along with less life stress.    We discussed the following plan today:   Continue the same dose of primidone  Reduce amitriptyline to 25 mg each night  Return in 6 months

## 2025-07-01 DIAGNOSIS — E11.9 TYPE 2 DIABETES MELLITUS WITHOUT COMPLICATION, WITHOUT LONG-TERM CURRENT USE OF INSULIN (HCC): ICD-10-CM

## 2025-07-01 RX ORDER — LANCETS 33 GAUGE
EACH MISCELLANEOUS
Qty: 100 EACH | Refills: 3 | Status: SHIPPED | OUTPATIENT
Start: 2025-07-01

## 2025-07-31 ENCOUNTER — TELEPHONE (OUTPATIENT)
Dept: NEUROLOGY | Facility: CLINIC | Age: 65
End: 2025-07-31
Payer: COMMERCIAL

## 2025-07-31 DIAGNOSIS — G43.719 INTRACTABLE CHRONIC MIGRAINE WITHOUT AURA AND WITHOUT STATUS MIGRAINOSUS: ICD-10-CM

## 2025-07-31 RX ORDER — AMITRIPTYLINE HYDROCHLORIDE 25 MG/1
TABLET, FILM COATED ORAL
Qty: 90 TABLET | Refills: 3 | Status: SHIPPED | OUTPATIENT
Start: 2025-07-31

## 2025-12-09 ENCOUNTER — APPOINTMENT (OUTPATIENT)
Dept: NEUROLOGY | Facility: CLINIC | Age: 65
End: 2025-12-09
Payer: COMMERCIAL